# Patient Record
Sex: FEMALE | Race: WHITE | Employment: PART TIME | URBAN - METROPOLITAN AREA
[De-identification: names, ages, dates, MRNs, and addresses within clinical notes are randomized per-mention and may not be internally consistent; named-entity substitution may affect disease eponyms.]

---

## 2017-02-13 PROBLEM — N93.9 MENSTRUAL BLEEDING PROBLEM: Status: ACTIVE | Noted: 2017-02-13

## 2017-07-31 PROBLEM — N89.8 VAGINAL DISCHARGE: Status: ACTIVE | Noted: 2017-07-31

## 2017-07-31 PROBLEM — R32 INCONTINENCE IN FEMALE: Status: ACTIVE | Noted: 2017-07-31

## 2017-09-14 ENCOUNTER — APPOINTMENT (OUTPATIENT)
Dept: PHYSICAL THERAPY | Age: 32
End: 2017-09-14
Attending: OBSTETRICS & GYNECOLOGY

## 2017-09-29 ENCOUNTER — APPOINTMENT (OUTPATIENT)
Dept: PHYSICAL THERAPY | Age: 32
End: 2017-09-29
Attending: OBSTETRICS & GYNECOLOGY

## 2017-10-05 ENCOUNTER — APPOINTMENT (OUTPATIENT)
Dept: PHYSICAL THERAPY | Age: 32
End: 2017-10-05
Attending: OBSTETRICS & GYNECOLOGY

## 2017-10-12 ENCOUNTER — APPOINTMENT (OUTPATIENT)
Dept: PHYSICAL THERAPY | Age: 32
End: 2017-10-12
Attending: OBSTETRICS & GYNECOLOGY

## 2017-10-19 ENCOUNTER — APPOINTMENT (OUTPATIENT)
Dept: PHYSICAL THERAPY | Age: 32
End: 2017-10-19
Attending: OBSTETRICS & GYNECOLOGY

## 2017-10-26 ENCOUNTER — APPOINTMENT (OUTPATIENT)
Dept: PHYSICAL THERAPY | Age: 32
End: 2017-10-26
Attending: OBSTETRICS & GYNECOLOGY

## 2017-11-10 ENCOUNTER — HOSPITAL ENCOUNTER (OUTPATIENT)
Dept: PHYSICAL THERAPY | Age: 32
Discharge: HOME | End: 2017-11-10
Attending: OBSTETRICS & GYNECOLOGY

## 2017-11-10 NOTE — PROGRESS NOTES
Therapy Center at [address]  [address], [address] [facility], [facility] [address]  Phone: [phone]   Fax: [phone]    Pt cancelled today's physical therapy appointment for an initial evaluation.       Vish Pathak, MPT

## 2018-01-03 PROBLEM — R00.2 PALPITATIONS: Status: ACTIVE | Noted: 2018-01-03

## 2018-04-02 ENCOUNTER — HOSPITAL ENCOUNTER (OUTPATIENT)
Dept: PHYSICAL THERAPY | Age: 33
Discharge: HOME | End: 2018-04-02
Attending: OBSTETRICS & GYNECOLOGY
Payer: COMMERCIAL

## 2018-04-02 DIAGNOSIS — N39.3 FEMALE STRESS INCONTINENCE: ICD-10-CM

## 2018-04-02 PROCEDURE — 97161 PT EVAL LOW COMPLEX 20 MIN: CPT

## 2018-04-02 NOTE — PROGRESS NOTES
Ambulatory/Rehab Services H2 Model Falls Risk Assessment    Risk Factor Pts. ·   Confusion/Disorientation/Impulsivity  []    4 ·   Symptomatic Depression  []   2 ·   Altered Elimination  []   1 ·   Dizziness/Vertigo  []   1 ·   Gender (Male)  []   1 ·   Any administered antiepileptics (anticonvulsants):  []   2 ·   Any administered benzodiazepines:  []   1 ·   Visual Impairment (specify):  []   1 ·   Portable Oxygen Use  []   1 ·   Orthostatic ? BP  []   1 ·   History of Recent Falls (within 3 mos.)  []   5     Ability to Rise from Chair (choose one) Pts. ·   Ability to rise in a single movement  [x]   0 ·   Pushes up, successful in one attempt  []   1 ·   Multiple attempts, but successful  []   3 ·   Unable to rise without assistance  []   4   Total: (5 or greater = High Risk) 0     Falls Prevention Plan:   []                Physical Limitations to Exercise (specify):   []                Mobility Assistance Device (type):   []                Exercise/Equipment Adaptation (specify):    ©2010 [facility] of [address] Patent #[id number].  Federal Law prohibits the replication, distribution or use without written permission from [facility] [employer]

## 2018-04-02 NOTE — THERAPY EVALUATION
Steve Anders  : 1985  Primary: [facility]  Secondary:  [address] at [facility]  [address], [address], [address]  [phone]   [phone]           OUTPATIENT PHYSICAL THERAPY:Initial Assessment 2018    ICD-10: Treatment Diagnosis: Stress Incontinence (N39.3)   Precautions/Allergies:   Doxycycline and Bactrim [sulfamethoprim ds]   Fall Risk Score: 0 (? 5 = High Risk)  MD Orders: Evaluate and treat  MEDICAL/REFERRING DIAGNOSIS:  Female stress incontinence [N39.3]   DATE OF ONSET: 6 yrs  REFERRING PHYSICIAN: Zack Vásquez MD  RETURN PHYSICIAN APPOINTMENT: unknown     INITIAL ASSESSMENT:  Ms. Jenn Gustafson presents with decreased strength, endurance, and coordination of PFmm as well as mild laxity of anterior vaginal wall, both likely contributing to her mild SHANNON. She was only able to hold contraction 2 sec at a time today at a 2/5 strength. Pt will benefit from physical therapy to address stated problems. Plan of care was discussed and agreed upon with patient and HEP was initiated. Thank you for the opportunity to work with this patient. PROBLEM LIST (Impacting functional limitations):  1. Decreased strength of pelvic floor which limits bladder control INTERVENTIONS PLANNED:  1. Neuromuscular re-education  2. Biofeedback as needed  3. HEP  4. Bladder retraining  5. Bladder education  6. Manual Therapy  7. Therapeutic Activites  8. Therapeutic Exercise/Strengthening   TREATMENT PLAN:  Effective Dates: 2018 TO 2018 (90 days). Frequency/Duration: 1 time a week for 90 Days  GOALS: (Goals have been discussed and agreed upon with patient.)  Short-Term Functional Goals: Time Frame: 4 weeks  1. Patient will demonstrate independence with home exercise program.  2. Pt will demonstrate ability to perform kegel in isolation for improved ability to strengthen  Discharge Goals: Time Frame: 2018   1.  Pt will score 0 on PFIQ7 for overall functional improvement. 2. Pt will demonstrate improvement in PFmm strength and endurance to perform kegel 10/10 on biofeedback. 3. Pt will report ability to perform a full day at work without any leaking for decreased social anxiety. Rehabilitation Potential For Stated Goals: Good  Regarding Don Mcdaniel's therapy, I certify that the treatment plan above will be carried out by a therapist or under their direction. Thank you for this referral,  Dora Mclean PT     Referring Physician Signature: Sin Child MD              Date                    The information in this section was collected on 04/02/18  (except where otherwise noted). HISTORY:   History of Present Injury/Illness (Reason for Referral):  6 year history. Feels like she can't work PFmm correctly. Not needing pads yet. Can't feel leaking just notices underwear a little wet sometimes. Past Medical History/Comorbidities:   Ms. Benja Marrero  has a past medical history of Arthritis; Cervical cancer ([address] Kidney stones; Neurological disorder; Other ill-defined conditions(799[address]89); and Psychiatric disorder. She also has no past medical history of Asthma; Autoimmune disease ([address] CAD (coronary artery disease); Cancer ([address] Chronic kidney disease; COPD; DEMENTIA; Diabetes ([address] Endocrine disease; Gastrointestinal disorder; Heart failure ([address] Hypertension; Infectious disease; Liver disease; PUD (peptic ulcer disease); Seizures ([address] Sleep disorder; Stroke [facility]); or Thromboembolus ([address] Ms. Benja Marrero  has a past surgical history that includes hx gyn and hx tubal ligation. 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         Activity level:works out occasionally  Current Medications:    Current Outpatient Prescriptions:     amoxicillin (AMOXIL) 250 mg capsule, TAKE ONE CAPSULE BY MOUTH TWICE A DAY WITH FOOD, Disp: , Rfl: 0    PARoxetine CR (PAXIL-CR) 37.5 mg tablet, TAKE 1 TABLET EVERY DAY, Disp: , Rfl: 1    buPROPion SR (WELLBUTRIN SR) 150 mg SR tablet, Take 150 mg by mouth., Disp: , Rfl:    Date Last Reviewed:  04/02/18   Voiding Patterns:  Patient voids every few hours during the day and 1-2 times during the night. Patient reports that she empties bladder fully only after she stands up after urinating. Patient uses 0 pads for bladder protection; she changes pads 0 times per day. Fluid Intake:  Patient drinks 2-3 cups of water per day. She drinks primarily diet soda and coffee  She consumes 4-5 cups of caffeinated beverages per day. Patient does not limit fluid intake to control bladder. Bowel Habits:  Patient demonstrates normal bowel habits.   Incontinence History:  PROBLEM: YES/NO: COMMENTS:   Loss of urine with coughing Don't know  usually don't know when leak   Loss of urine with exercise Yes  jump trampoline   Loss of urine with strong urge No     Loss of urine with key in lock or running water No     Have difficulty stopping urine stream No     Experience pain or burning with urination No     Have blood in urine No         Number of Personal Factors/Comorbidities that affect the Plan of Care: 1-2: MODERATE COMPLEXITY   EXAMINATION:   External Observation:   · Voluntary Contraction: present  · Voluntary Relaxation: present  · Perineal Body Assessment: some prior tearing evident  · Skin Integrity: normal  · Q-tip Test: no pain  · Vaginal Vault Size: within normal limits    Lacock PERFECT:  Via: vaginal canal        Strength: P: Power, E: Endurance, R: Repetitions, QF: Quick Flicks, TrA: Transverse Abdominus, T: Timing, DB: Diaphragmatic Breathing  P  2   E 2sec   R 6   QF 7   TrA Contraction but unable to coordinate with PF   T    DB Able without PFcontraction but limited during kegel                 Palpation:     Right Left   Bulbocavernosus Ischocavernosus     Superficial Transverse Perineal     Sphincter Urethrae     Compressor Urethra      Urethra-vaginalis     Deep Transverse Perineium     Obturator Internus     Iliococcygeus     Coccygeus     Pubococcygeus     Levator Ani     Adductor tender tender   Psoas     Ant. Abdominal wall             Prolapse:  · Tissue support test with bearing down (Grade 1: not visible at introitus; Grade 2: visible at introitus; Grade 3: tissue outside introitus):  · Anterior Wall = 2   · Posterior Wall = 1   · Apical = 1       Body Structures Involved:  1. Nerves  2. Muscles  3. Ligaments Body Functions Affected:  1. Mental  2. Sensory/Pain  3. Genitourinary  4. Movement Related  5. Skin Related Activities and Participation Affected:  1. Learning and Applying Knowledge  2. General Tasks and Demands  3. Mobility  4. Self Care  5. Interpersonal Interactions and Relationships   Number of elements that affect the Plan of Care: 3: MODERATE COMPLEXITY   CLINICAL PRESENTATION:   Presentation: Stable and uncomplicated: LOW COMPLEXITY   CLINICAL DECISION MAKING:   Outcome Measure: Tool Used: Pelvic Floor Impact Questionnaire--short form 7 (PFIQ-7)   Score:  Initial:   1.59%  · Bladder or Urine: 4.76  · Bowel or Rectum: 0  · Vagina or Pelvis: 0 Most Recent: X (Date: -- )  · Bladder or Urine: X  · Bowel or Rectum: X  · Vagina or Pelvis: X   Interpretation of Score: Each of the 7 sections is scored on a scale from 0-3; 0 representing \"Not at all\", 3 representing \"Quite a bit\". The mean value is taken from all the answered items, then multiplied by 100 to obtain the scale score, ranging from 0-100. This process is repeated for each column representing bowel, bladder, and pelvic pain. ?  Self Care:     - CURRENT STATUS: CI - 1%-19% impaired, limited or restricted    - GOAL STATUS: CH - 0% impaired, limited or restricted    - D/C STATUS:  ---------------To be determined---------------     Medical Necessity: · Patient demonstrates good rehab potential due to higher previous functional level. Reason for Services/Other Comments:  · Patient continues to require skilled intervention due to ongoing goals noted above . Use of outcome tool(s) and clinical judgement create a POC that gives a: Clear prediction of patient's progress: LOW COMPLEXITY   TREATMENT:   (In addition to Assessment/Re-Assessment sessions the following treatments were rendered)  Pre-treatment Symptoms/Complaints:  See above   Pain: Initial:   Pain Intensity 1: 0  Post Session:  0     THERAPEUTIC EXERCISE: (  minutes):  Exercises per grid below to improve strength and coordination. Required minimal verbal and tactile cues to promote proper body mechanics and promote proper body breathing techniques. Progressed resistance and repetitions as indicated. Date:   Date:   Date:     Activity/Exercise Parameters Parameters Parameters                                                (Used abbreviations: MET - muscle energy technique; SCS- Strain counter strain; CTM- Connective tissue mobilizations; CR- Contract/relax; SP- Sustained pressure, TrP- Trigger point release, IASTM- Instrument assisted soft tissue mobilizations, TDN- Trigger point dry needling). HEP Exercises:  Patient instructed in pelvic floor exercises listed below:  toña 3sec hold 10-15reps TID    "[employer] Portal    The following educational topics were reviewed with patient:  Bladder health, tips to control urge, bladder diary, pelvic floor anatomy, how foods affect bladder, bladder retraining. Treatment/Session Assessment:    Response to Treatment:  Pt reported good understanding of plan of care as well as exercises. All questions were answered and pt was invited to call with any further questions or issues  · Compliance with Program/Exercises: Will assess as treatment progresses. · Recommendations/Intent for next treatment session:  \"Next visit will focus on advancements to more challenging activities\".   Total Treatment Duration:  PT Patient Time In/Time Out  Time In: 1100  Time Out: [address] [facility] Eleanor Lindo

## 2018-04-11 ENCOUNTER — HOSPITAL ENCOUNTER (OUTPATIENT)
Dept: PHYSICAL THERAPY | Age: 33
Discharge: HOME | End: 2018-04-11
Attending: OBSTETRICS & GYNECOLOGY
Payer: COMMERCIAL

## 2018-04-11 DIAGNOSIS — N39.3 FEMALE STRESS INCONTINENCE: ICD-10-CM

## 2018-04-11 PROCEDURE — 97110 THERAPEUTIC EXERCISES: CPT

## 2018-04-11 NOTE — PROGRESS NOTES
Jayme Walsh  : 1985  Primary: [facility]  Secondary:  [address]  at [facility]  [address], [address] [address], [address] [facility]  [phone]   [phone]           OUTPATIENT PHYSICAL THERAPY:Daily Note 2018    ICD-10: Treatment Diagnosis: Stress Incontinence (N39.3)   Precautions/Allergies:   Doxycycline and Bactrim [sulfamethoprim ds]   Fall Risk Score: 0 (? 5 = High Risk)  MD Orders: Evaluate and treat  MEDICAL/REFERRING DIAGNOSIS:  Female stress incontinence [N39.3]   DATE OF ONSET: 6 yrs  REFERRING PHYSICIAN: Petra Langford MD  RETURN PHYSICIAN APPOINTMENT: unknown     INITIAL ASSESSMENT:  Ms. Arlene Lozano presents with decreased strength, endurance, and coordination of PFmm as well as mild laxity of anterior vaginal wall, both likely contributing to her mild SHANNON. She was only able to hold contraction 2 sec at a time today at a 2/5 strength. Pt will benefit from physical therapy to address stated problems. Plan of care was discussed and agreed upon with patient and HEP was initiated. Thank you for the opportunity to work with this patient. PROBLEM LIST (Impacting functional limitations):  1. Decreased strength of pelvic floor which limits bladder control INTERVENTIONS PLANNED:  1. Neuromuscular re-education  2. Biofeedback as needed  3. HEP  4. Bladder retraining  5. Bladder education  6. Manual Therapy  7. Therapeutic Activites  8. Therapeutic Exercise/Strengthening   TREATMENT PLAN:  Effective Dates: 2018 TO 2018 (90 days). Frequency/Duration: 1 time a week for 90 Days  GOALS: (Goals have been discussed and agreed upon with patient.)  Short-Term Functional Goals: Time Frame: 4 weeks  1. Patient will demonstrate independence with home exercise program.  2. Pt will demonstrate ability to perform kegel in isolation for improved ability to strengthen  Discharge Goals: Time Frame: 2018   1.  Pt will score 0 on PFIQ7 for overall functional improvement. 2. Pt will demonstrate improvement in PFmm strength and endurance to perform kegel 10/10 on biofeedback. 3. Pt will report ability to perform a full day at work without any leaking for decreased social anxiety. Rehabilitation Potential For Stated Goals: Good  Regarding [facility]'s therapy, I certify that the treatment plan above will be carried out by a therapist or under their direction. Thank you for this referral,  Kev Lopes, PT     Referring Physician Signature: Zack Vásquez MD              Date                    The information in this section was collected on 04/11/18  (except where otherwise noted). HISTORY:   History of Present Injury/Illness (Reason for Referral):  6 year history. Feels like she can't work PFmm correctly. Not needing pads yet. Can't feel leaking just notices underwear a little wet sometimes. Past Medical History/Comorbidities:   Ms. Jenn Gustafson  has a past medical history of Arthritis; Cervical cancer ([address] Kidney stones; Neurological disorder; Other ill-defined conditions(7[address]); and Psychiatric disorder. She also has no past medical history of Asthma; Autoimmune disease ([address] CAD (coronary artery disease); Cancer ([address] Chronic kidney disease; COPD; DEMENTIA; Diabetes ([address] Endocrine disease; Gastrointestinal disorder; Heart failure ([address] Hypertension; Infectious disease; Liver disease; PUD (peptic ulcer disease); Seizures ([address] Sleep disorder; Stroke [facility]); or Thromboembolus ([address] Ms. Jenn Gustafson  has a past surgical history that includes hx gyn and hx tubal ligation. 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         Activity level:works out occasionally  Current Medications:    Current Outpatient Prescriptions:     amoxicillin (AMOXIL) 250 mg capsule, TAKE ONE CAPSULE BY MOUTH TWICE A DAY WITH FOOD, Disp: , Rfl: 0    PARoxetine CR (PAXIL-CR) 37.5 mg tablet, TAKE 1 TABLET EVERY DAY, Disp: , Rfl: 1    buPROPion SR (WELLBUTRIN SR) 150 mg SR tablet, Take 150 mg by mouth., Disp: , Rfl:    Date Last Reviewed:  04/11/18   Voiding Patterns:  Patient voids every few hours during the day and 1-2 times during the night. Patient reports that she empties bladder fully only after she stands up after urinating. Patient uses 0 pads for bladder protection; she changes pads 0 times per day. Fluid Intake:  Patient drinks 2-3 cups of water per day. She drinks primarily diet soda and coffee  She consumes 4-5 cups of caffeinated beverages per day. Patient does not limit fluid intake to control bladder. Bowel Habits:  Patient demonstrates normal bowel habits.   Incontinence History:  PROBLEM: YES/NO: COMMENTS:   Loss of urine with coughing Don't know  usually don't know when leak   Loss of urine with exercise Yes  jump trampoline   Loss of urine with strong urge No     Loss of urine with key in lock or running water No     Have difficulty stopping urine stream No     Experience pain or burning with urination No     Have blood in urine No         Number of Personal Factors/Comorbidities that affect the Plan of Care: 1-2: MODERATE COMPLEXITY   EXAMINATION:   External Observation:   · Voluntary Contraction: present  · Voluntary Relaxation: present  · Perineal Body Assessment: some prior tearing evident  · Skin Integrity: normal  · Q-tip Test: no pain  · Vaginal Vault Size: within normal limits    [place]ock PERFECT:  Via: vaginal canal        Strength: P: Power, E: Endurance, R: Repetitions, QF: Quick Flicks, TrA: Transverse Abdominus, T: Timing, DB: Diaphragmatic Breathing  P  2   E 2sec   R 6   QF 7   TrA Contraction but unable to coordinate with PF   T    DB Able without PFcontraction but limited during kegel                 Palpation:     Right Left   Bulbocavernosus     Ischocavernosus Superficial Transverse Perineal     Sphincter Urethrae     Compressor Urethra      Urethra-vaginalis     Deep Transverse Perineium     Obturator Internus     Iliococcygeus     Coccygeus     Pubococcygeus     Levator Ani     Adductor tender tender   Psoas     Ant. Abdominal wall             Prolapse:  · Tissue support test with bearing down (Grade 1: not visible at introitus; Grade 2: visible at introitus; Grade 3: tissue outside introitus):  · Anterior Wall = 2   · Posterior Wall = 1   · Apical = 1       Body Structures Involved:  1. Nerves  2. Muscles  3. Ligaments Body Functions Affected:  1. Mental  2. Sensory/Pain  3. Genitourinary  4. Movement Related  5. Skin Related Activities and Participation Affected:  1. Learning and Applying Knowledge  2. General Tasks and Demands  3. Mobility  4. Self Care  5. Interpersonal Interactions and Relationships   Number of elements that affect the Plan of Care: 3: MODERATE COMPLEXITY   CLINICAL PRESENTATION:   Presentation: Stable and uncomplicated: LOW COMPLEXITY   CLINICAL DECISION MAKING:   Outcome Measure: Tool Used: Pelvic Floor Impact Questionnaire--short form 7 (PFIQ-7)   Score:  Initial:   1.59%  · Bladder or Urine: 4.76  · Bowel or Rectum: 0  · Vagina or Pelvis: 0 Most Recent: X (Date: -- )  · Bladder or Urine: X  · Bowel or Rectum: X  · Vagina or Pelvis: X   Interpretation of Score: Each of the 7 sections is scored on a scale from 0-3; 0 representing \"Not at all\", 3 representing \"Quite a bit\". The mean value is taken from all the answered items, then multiplied by 100 to obtain the scale score, ranging from 0-100. This process is repeated for each column representing bowel, bladder, and pelvic pain. ?  Self Care:     - CURRENT STATUS: CI - 1%-19% impaired, limited or restricted    - GOAL STATUS: CH - 0% impaired, limited or restricted    - D/C STATUS:  ---------------To be determined---------------     Medical Necessity:   · Patient demonstrates good rehab potential due to higher previous functional level. Reason for Services/Other Comments:  · Patient continues to require skilled intervention due to ongoing goals noted above . Use of outcome tool(s) and clinical judgement create a POC that gives a: Clear prediction of patient's progress: LOW COMPLEXITY   TREATMENT:   (In addition to Assessment/Re-Assessment sessions the following treatments were rendered)  Pre-treatment Symptoms/Complaints:  States she has been doing ex's once a day. No new c/o  Pain: Initial:   Pain Intensity 1: 0  Post Session:  0     THERAPEUTIC EXERCISE: (55  minutes):  Exercises per grid below to improve strength and coordination. Required minimal verbal and tactile cues to promote proper body mechanics and promote proper body breathing techniques. Progressed resistance and repetitions as indicated. Date:  4/11/18 Date:   Date:     Activity/Exercise Parameters Parameters Parameters   Isometric PFmm biofeedback     NMES 10'       TA with kegel                               biofeedback utilized to assess resting tone, 5/10,     (Used abbreviations: MET - muscle energy technique; SCS- Strain counter strain; CTM- Connective tissue mobilizations; CR- Contract/relax; SP- Sustained pressure, TrP- Trigger point release, IASTM- Instrument assisted soft tissue mobilizations, TDN- Trigger point dry needling). HEP Exercises:  Patient instructed in pelvic floor exercises listed below:  kegel 3sec hold 10-15reps TID    [employer] Portal    The following educational topics were reviewed with patient:  Bladder health, tips to control urge, bladder diary, pelvic floor anatomy, how foods affect bladder, bladder retraining. Treatment/Session Assessment:    Response to Treatment:  High resting tone noted with internal electrode but normal reading with external electrodes - likely due to anterior wall laxity.    Continue with difficulty isolating PFmm with tendency to use abdominals and breath hold.   · Compliance with Program/Exercises: Will assess as treatment progresses. · Recommendations/Intent for next treatment session: \"Next visit will focus on advancements to more challenging activities\".   Total Treatment Duration:  PT Patient Time In/Time Out  Time In: 1330  Time Out: [address] [facility] [place]

## 2018-04-19 ENCOUNTER — HOSPITAL ENCOUNTER (OUTPATIENT)
Dept: PHYSICAL THERAPY | Age: 33
Discharge: HOME | End: 2018-04-19
Attending: OBSTETRICS & GYNECOLOGY
Payer: COMMERCIAL

## 2018-04-19 DIAGNOSIS — N39.3 FEMALE STRESS INCONTINENCE: ICD-10-CM

## 2018-04-19 PROCEDURE — 97110 THERAPEUTIC EXERCISES: CPT

## 2018-04-19 NOTE — PROGRESS NOTES
Jeison Anthony  : 1985  Primary: [facility]  Secondary:  [address]  at [place] [id number] [facility], [address] [place] [address],[address], [facility] [address]  [phone]   [phone]           OUTPATIENT PHYSICAL THERAPY:Daily Note 2018    ICD-10: Treatment Diagnosis: Stress Incontinence (N39.3)   Precautions/Allergies:   Doxycycline and Bactrim [sulfamethoprim ds]   Fall Risk Score: 0 (? 5 = High Risk)  MD Orders: Evaluate and treat  MEDICAL/REFERRING DIAGNOSIS:  Female stress incontinence [N39.3]   DATE OF ONSET: 6 yrs  REFERRING PHYSICIAN: Connie Pineda MD  RETURN PHYSICIAN APPOINTMENT: unknown     INITIAL ASSESSMENT:  Ms. Corrinne Alto presents with decreased strength, endurance, and coordination of PFmm as well as mild laxity of anterior vaginal wall, both likely contributing to her mild SHANNON. She was only able to hold contraction 2 sec at a time today at a 2/5 strength. Pt will benefit from physical therapy to address stated problems. Plan of care was discussed and agreed upon with patient and HEP was initiated. Thank you for the opportunity to work with this patient. PROBLEM LIST (Impacting functional limitations):  1. Decreased strength of pelvic floor which limits bladder control INTERVENTIONS PLANNED:  1. Neuromuscular re-education  2. Biofeedback as needed  3. HEP  4. Bladder retraining  5. Bladder education  6. Manual Therapy  7. Therapeutic Activites  8. Therapeutic Exercise/Strengthening   TREATMENT PLAN:  Effective Dates: 2018 TO 2018 (90 days). Frequency/Duration: 1 time a week for 90 Days  GOALS: (Goals have been discussed and agreed upon with patient.)  Short-Term Functional Goals: Time Frame: 4 weeks  1. Patient will demonstrate independence with home exercise program.  2. Pt will demonstrate ability to perform kegel in isolation for improved ability to strengthen  Discharge Goals: Time Frame: 2018   1.  Pt will score 0 on PFIQ7 for overall functional improvement. 2. Pt will demonstrate improvement in PFmm strength and endurance to perform kegel 10/10 on biofeedback. 3. Pt will report ability to perform a full day at work without any leaking for decreased social anxiety. Rehabilitation Potential For Stated Goals: Good  Regarding Sandor Mcdaniel's therapy, I certify that the treatment plan above will be carried out by a therapist or under their direction. Thank you for this referral,  Karen Armenta, PT     Referring Physician Signature: Munir Nielson MD              Date                    The information in this section was collected on 04/19/18  (except where otherwise noted). HISTORY:   History of Present Injury/Illness (Reason for Referral):  6 year history. Feels like she can't work PFmm correctly. Not needing pads yet. Can't feel leaking just notices underwear a little wet sometimes. Past Medical History/Comorbidities:   Ms. Funmilayo Baldwin  has a past medical history of Arthritis; Cervical cancer ([address] Kidney stones; Neurological disorder; Other ill-defined conditions(799.89); and Psychiatric disorder. She also has no past medical history of Asthma; Autoimmune disease ([address] CAD (coronary artery disease); Cancer ([address] Chronic kidney disease; COPD; DEMENTIA; Diabetes ([address] Endocrine disease; Gastrointestinal disorder; Heart failure ([address] Hypertension; Infectious disease; Liver disease; PUD (peptic ulcer disease); Seizures ([address] Sleep disorder; Stroke [facility]); or Thromboembolus ([address] Ms. Funmilayo Baldwin  has a past surgical history that includes hx gyn and hx tubal ligation. 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         Activity level:works out occasionally  Current Medications:    Current Outpatient Prescriptions:     amoxicillin (AMOXIL) 250 mg capsule, TAKE ONE CAPSULE BY MOUTH TWICE A DAY WITH FOOD, Disp: , Rfl: 0    PARoxetine CR (PAXIL-CR) 37.5 mg tablet, TAKE 1 TABLET EVERY DAY, Disp: , Rfl: 1    buPROPion SR (WELLBUTRIN SR) 150 mg SR tablet, Take 150 mg by mouth., Disp: , Rfl:    Date Last Reviewed:  04/19/18   Voiding Patterns:  Patient voids every few hours during the day and 1-2 times during the night. Patient reports that she empties bladder fully only after she stands up after urinating. Patient uses 0 pads for bladder protection; she changes pads 0 times per day. Fluid Intake:  Patient drinks 2-3 cups of water per day. She drinks primarily diet soda and coffee  She consumes 4-5 cups of caffeinated beverages per day. Patient does not limit fluid intake to control bladder. Bowel Habits:  Patient demonstrates normal bowel habits.   Incontinence History:  PROBLEM: YES/NO: COMMENTS:   Loss of urine with coughing Don't know  usually don't know when leak   Loss of urine with exercise Yes  jump trampoline   Loss of urine with strong urge No     Loss of urine with key in lock or running water No     Have difficulty stopping urine stream No     Experience pain or burning with urination No     Have blood in urine No         Number of Personal Factors/Comorbidities that affect the Plan of Care: 1-2: MODERATE COMPLEXITY   EXAMINATION:   External Observation:   · Voluntary Contraction: present  · Voluntary Relaxation: present  · Perineal Body Assessment: some prior tearing evident  · Skin Integrity: normal  · Q-tip Test: no pain  · Vaginal Vault Size: within normal limits    [place]ock PERFECT:  Via: vaginal canal        Strength: P: Power, E: Endurance, R: Repetitions, QF: Quick Flicks, TrA: Transverse Abdominus, T: Timing, DB: Diaphragmatic Breathing  P  2   E 2sec   R 6   QF 7   TrA Contraction but unable to coordinate with PF   T    DB Able without PFcontraction but limited during kegel                 Palpation:     Right Left   Bulbocavernosus     Ischocavernosus Superficial Transverse Perineal     Sphincter Urethrae     Compressor Urethra      Urethra-vaginalis     Deep Transverse Perineium     Obturator Internus     Iliococcygeus     Coccygeus     Pubococcygeus     Levator Ani     Adductor tender tender   Psoas     Ant. Abdominal wall             Prolapse:  · Tissue support test with bearing down (Grade 1: not visible at introitus; Grade 2: visible at introitus; Grade 3: tissue outside introitus):  · Anterior Wall = 2   · Posterior Wall = 1   · Apical = 1       Body Structures Involved:  1. Nerves  2. Muscles  3. Ligaments Body Functions Affected:  1. Mental  2. Sensory/Pain  3. Genitourinary  4. Movement Related  5. Skin Related Activities and Participation Affected:  1. Learning and Applying Knowledge  2. General Tasks and Demands  3. Mobility  4. Self Care  5. Interpersonal Interactions and Relationships   Number of elements that affect the Plan of Care: 3: MODERATE COMPLEXITY   CLINICAL PRESENTATION:   Presentation: Stable and uncomplicated: LOW COMPLEXITY   CLINICAL DECISION MAKING:   Outcome Measure: Tool Used: Pelvic Floor Impact Questionnaire--short form 7 (PFIQ-7)   Score:  Initial:   1.59%  · Bladder or Urine: 4.76  · Bowel or Rectum: 0  · Vagina or Pelvis: 0 Most Recent: X (Date: -- )  · Bladder or Urine: X  · Bowel or Rectum: X  · Vagina or Pelvis: X   Interpretation of Score: Each of the 7 sections is scored on a scale from 0-3; 0 representing \"Not at all\", 3 representing \"Quite a bit\". The mean value is taken from all the answered items, then multiplied by 100 to obtain the scale score, ranging from 0-100. This process is repeated for each column representing bowel, bladder, and pelvic pain. ?  Self Care:     - CURRENT STATUS: CI - 1%-19% impaired, limited or restricted    - GOAL STATUS: CH - 0% impaired, limited or restricted    - D/C STATUS:  ---------------To be determined---------------     Medical Necessity:   · Patient demonstrates good rehab potential due to higher previous functional level. Reason for Services/Other Comments:  · Patient continues to require skilled intervention due to ongoing goals noted above . Use of outcome tool(s) and clinical judgement create a POC that gives a: Clear prediction of patient's progress: LOW COMPLEXITY   TREATMENT:   (In addition to Assessment/Re-Assessment sessions the following treatments were rendered)  Pre-treatment Symptoms/Complaints:  No new c/o. Bought k-fit and brought it in today. States she jumped on the trampoline yesterday and feels she didn't have ki  Pain: Initial:   Pain Intensity 1: 0  Post Session:  0     THERAPEUTIC EXERCISE: (60  minutes):  Exercises per grid below to improve strength and coordination. Required minimal verbal and tactile cues to promote proper body mechanics and promote proper body breathing techniques. Progressed resistance and repetitions as indicated. Date:  4/11/18 Date:  4/19/19 Date:     Activity/Exercise Parameters Parameters Parameters   Isometric PFmm biofeedback biofeedback    NMES 10'       TA with kegel  5\" 10x    Bridge with ball squeeze  10x    Bridge with isometric ER  BTB 10x    Sidelying clams with TB      squats  10x     biofeedback utilized to assess resting tone, 5/10,     (Used abbreviations: MET - muscle energy technique; SCS- Strain counter strain; CTM- Connective tissue mobilizations; CR- Contract/relax; SP- Sustained pressure, TrP- Trigger point release, IASTM- Instrument assisted soft tissue mobilizations, TDN- Trigger point dry needling). HEP Exercises:  Patient instructed in pelvic floor exercises listed below:  kegel 3sec hold 10-15reps TID    [employer] Portal    The following educational topics were reviewed with patient:  Bladder health, tips to control urge, bladder diary, pelvic floor anatomy, how foods affect bladder, bladder retraining.   Treatment/Session Assessment:    Response to Treatment:  Much improved isolation during biofeedback. Good initiation of functional ex's. Set up NMES for use at home. Progress as able  · Compliance with Program/Exercises: Will assess as treatment progresses. · Recommendations/Intent for next treatment session: \"Next visit will focus on advancements to more challenging activities\".   Total Treatment Duration:  PT Patient Time In/Time Out  Time In: 0930  Time Out: 1030    Raghu Salinas [place]

## 2018-04-27 ENCOUNTER — APPOINTMENT (OUTPATIENT)
Dept: PHYSICAL THERAPY | Age: 33
End: 2018-04-27
Attending: OBSTETRICS & GYNECOLOGY
Payer: COMMERCIAL

## 2018-05-04 ENCOUNTER — HOSPITAL ENCOUNTER (OUTPATIENT)
Dept: PHYSICAL THERAPY | Age: 33
Discharge: HOME | End: 2018-05-04
Attending: OBSTETRICS & GYNECOLOGY
Payer: COMMERCIAL

## 2018-05-04 DIAGNOSIS — N39.3 FEMALE STRESS INCONTINENCE: ICD-10-CM

## 2018-05-04 PROCEDURE — 97110 THERAPEUTIC EXERCISES: CPT

## 2018-05-04 NOTE — PROGRESS NOTES
Jeison Anthony  : 1985  Primary: [facility]  Secondary:  [facility] at [place] [address],[address], [facility] [address]  [phone]   [phone]           OUTPATIENT PHYSICAL THERAPY:Daily Note 2018    ICD-10: Treatment Diagnosis: Stress Incontinence (N39.3)   Precautions/Allergies:   Doxycycline and Bactrim [sulfamethoprim ds]   Fall Risk Score: 0 (? 5 = High Risk)  MD Orders: Evaluate and treat  MEDICAL/REFERRING DIAGNOSIS:  Female stress incontinence [N39.3]   DATE OF ONSET: 6 yrs  REFERRING PHYSICIAN: Connie Pineda MD  RETURN PHYSICIAN APPOINTMENT: unknown     INITIAL ASSESSMENT:  Ms. Corrinne Alto presents with decreased strength, endurance, and coordination of PFmm as well as mild laxity of anterior vaginal wall, both likely contributing to her mild SHANNON. She was only able to hold contraction 2 sec at a time today at a 2/5 strength. Pt will benefit from physical therapy to address stated problems. Plan of care was discussed and agreed upon with patient and HEP was initiated. Thank you for the opportunity to work with this patient. PROBLEM LIST (Impacting functional limitations):  1. Decreased strength of pelvic floor which limits bladder control INTERVENTIONS PLANNED:  1. Neuromuscular re-education  2. Biofeedback as needed  3. HEP  4. Bladder retraining  5. Bladder education  6. Manual Therapy  7. Therapeutic Activites  8. Therapeutic Exercise/Strengthening   TREATMENT PLAN:  Effective Dates: 2018 TO 2018 (90 days). Frequency/Duration: 1 time a week for 90 Days  GOALS: (Goals have been discussed and agreed upon with patient.)  Short-Term Functional Goals: Time Frame: 4 weeks  1. Patient will demonstrate independence with home exercise program.  2. Pt will demonstrate ability to perform kegel in isolation for improved ability to strengthen  Discharge Goals: Time Frame: 2018   1.  Pt will score 0 on PFIQ7 for overall functional improvement. 2. Pt will demonstrate improvement in PFmm strength and endurance to perform kegel 10/10 on biofeedback. 3. Pt will report ability to perform a full day at work without any leaking for decreased social anxiety. Rehabilitation Potential For Stated Goals: Good  Regarding Caterina Mcdaniel's therapy, I certify that the treatment plan above will be carried out by a therapist or under their direction. Thank you for this referral,  Dionte Sanchez, PT     Referring Physician Signature: Drew Hobbs MD              Date                    The information in this section was collected on 05/04/18  (except where otherwise noted). HISTORY:   History of Present Injury/Illness (Reason for Referral):  6 year history. Feels like she can't work PFmm correctly. Not needing pads yet. Can't feel leaking just notices underwear a little wet sometimes. Past Medical History/Comorbidities:   Ms. Kat Gonzales  has a past medical history of Arthritis; Cervical cancer ([address] Kidney stones; Neurological disorder; Other ill-defined conditions([address]); and Psychiatric disorder. She also has no past medical history of Asthma; Autoimmune disease ([address] CAD (coronary artery disease); Cancer ([address] Chronic kidney disease; COPD; DEMENTIA; Diabetes ([address] Endocrine disease; Gastrointestinal disorder; Heart failure ([address] Hypertension; Infectious disease; Liver disease; PUD (peptic ulcer disease); Seizures ([address] Sleep disorder; Stroke [facility]); or Thromboembolus ([address] Ms. Kat Gonzales  has a past surgical history that includes hx gyn and hx tubal ligation. 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         Activity level:works out occasionally  Current Medications:    Current Outpatient Prescriptions:     amoxicillin (AMOXIL) 250 mg capsule, TAKE ONE CAPSULE BY MOUTH TWICE A DAY WITH FOOD, Disp: , Rfl: 0    PARoxetine CR (PAXIL-CR) 37.5 mg tablet, TAKE 1 TABLET EVERY DAY, Disp: , Rfl: 1    buPROPion SR (WELLBUTRIN SR) 150 mg SR tablet, Take 150 mg by mouth., Disp: , Rfl:    Date Last Reviewed:  05/04/18   Voiding Patterns:  Patient voids every few hours during the day and 1-2 times during the night. Patient reports that she empties bladder fully only after she stands up after urinating. Patient uses 0 pads for bladder protection; she changes pads 0 times per day. Fluid Intake:  Patient drinks 2-3 cups of water per day. She drinks primarily diet soda and coffee  She consumes 4-5 cups of caffeinated beverages per day. Patient does not limit fluid intake to control bladder. Bowel Habits:  Patient demonstrates normal bowel habits.   Incontinence History:  PROBLEM: YES/NO: COMMENTS:   Loss of urine with coughing Don't know  usually don't know when leak   Loss of urine with exercise Yes  jump trampoline   Loss of urine with strong urge No     Loss of urine with key in lock or running water No     Have difficulty stopping urine stream No     Experience pain or burning with urination No     Have blood in urine No         Number of Personal Factors/Comorbidities that affect the Plan of Care: 1-2: MODERATE COMPLEXITY   EXAMINATION:   External Observation:   · Voluntary Contraction: present  · Voluntary Relaxation: present  · Perineal Body Assessment: some prior tearing evident  · Skin Integrity: normal  · Q-tip Test: no pain  · Vaginal Vault Size: within normal limits    [place]ock PERFECT:  Via: vaginal canal        Strength: P: Power, E: Endurance, R: Repetitions, QF: Quick Flicks, TrA: Transverse Abdominus, T: Timing, DB: Diaphragmatic Breathing  P  2   E 2sec   R 6   QF 7   TrA Contraction but unable to coordinate with PF   T    DB Able without PFcontraction but limited during kegel                 Palpation:     Right Left   Bulbocavernosus     Ischocavernosus Superficial Transverse Perineal     Sphincter Urethrae     Compressor Urethra      Urethra-vaginalis     Deep Transverse Perineium     Obturator Internus     Iliococcygeus     Coccygeus     Pubococcygeus     Levator Ani     Adductor tender tender   Psoas     Ant. Abdominal wall             Prolapse:  · Tissue support test with bearing down (Grade 1: not visible at introitus; Grade 2: visible at introitus; Grade 3: tissue outside introitus):  · Anterior Wall = 2   · Posterior Wall = 1   · Apical = 1       Body Structures Involved:  1. Nerves  2. Muscles  3. Ligaments Body Functions Affected:  1. Mental  2. Sensory/Pain  3. Genitourinary  4. Movement Related  5. Skin Related Activities and Participation Affected:  1. Learning and Applying Knowledge  2. General Tasks and Demands  3. Mobility  4. Self Care  5. Interpersonal Interactions and Relationships   Number of elements that affect the Plan of Care: 3: MODERATE COMPLEXITY   CLINICAL PRESENTATION:   Presentation: Stable and uncomplicated: LOW COMPLEXITY   CLINICAL DECISION MAKING:   Outcome Measure: Tool Used: Pelvic Floor Impact Questionnaire--short form 7 (PFIQ-7)   Score:  Initial:   1.59%  · Bladder or Urine: 4.76  · Bowel or Rectum: 0  · Vagina or Pelvis: 0 Most Recent: X (Date: -- )  · Bladder or Urine: X  · Bowel or Rectum: X  · Vagina or Pelvis: X   Interpretation of Score: Each of the 7 sections is scored on a scale from 0-3; 0 representing \"Not at all\", 3 representing \"Quite a bit\". The mean value is taken from all the answered items, then multiplied by 100 to obtain the scale score, ranging from 0-100. This process is repeated for each column representing bowel, bladder, and pelvic pain. ?  Self Care:     - CURRENT STATUS: CI - 1%-19% impaired, limited or restricted    - GOAL STATUS: CH - 0% impaired, limited or restricted    - D/C STATUS:  ---------------To be determined---------------     Medical Necessity:   · Patient demonstrates good rehab potential due to higher previous functional level. Reason for Services/Other Comments:  · Patient continues to require skilled intervention due to ongoing goals noted above . Use of outcome tool(s) and clinical judgement create a POC that gives a: Clear prediction of patient's progress: LOW COMPLEXITY   TREATMENT:   (In addition to Assessment/Re-Assessment sessions the following treatments were rendered)  Pre-treatment Symptoms/Complaints:  Pt has been doing NMES every day. Long sessions and had increase in leaking with hard workout lately. States she is unable to feel exactly when she is leaking just notices that underwear is wet. Pain: Initial:   Pain Intensity 1: 0  Post Session:  0     THERAPEUTIC EXERCISE: (60  minutes):  Exercises per grid below to improve strength and coordination. Required minimal verbal and tactile cues to promote proper body mechanics and promote proper body breathing techniques. Progressed resistance and repetitions as indicated. Date:  4/11/18 Date:  4/19/19 Date:  5/4/18   Activity/Exercise Parameters Parameters Parameters   Isometric PFmm biofeedback biofeedback    NMES 10'    home   TA with kegel  5\" 10x reviewed   Bridge with ball squeeze  10x 2 min   Bridge with isometric ER  BTB 10x BTB  30\" large range; 30\" small   Sidelying clams with TB      squats  10x 10x   Plank front   Hands 15\" x3   Plank side   15\" x3   Squat w one foot on box   10x bilat   Tramp jumps   10\" x3, 30\" x3          biofeedback utilized to assess resting tone, 5/10,     (Used abbreviations: MET - muscle energy technique; SCS- Strain counter strain; CTM- Connective tissue mobilizations; CR- Contract/relax; SP- Sustained pressure, TrP- Trigger point release, IASTM- Instrument assisted soft tissue mobilizations, TDN- Trigger point dry needling).      HEP Exercises:  Patient instructed in pelvic floor exercises listed below:  kegel 3sec hold 10-15reps TID    [employer] Portal    The following educational topics were reviewed with patient:  Bladder health, tips to control urge, bladder diary, pelvic floor anatomy, how foods affect bladder, bladder retraining. Treatment/Session Assessment:    Response to Treatment:  Discussed fatigue of muscles with extra long workouts every day when not used to it. Pt ed fluctuating workouts with NMES at home and not doing super long workouts every day. Pt noticed mild leaking after treatment and was unable to determine when she started leaking. · Compliance with Program/Exercises: Will assess as treatment progresses. · Recommendations/Intent for next treatment session: \"Next visit will focus on advancements to more challenging activities\".   Total Treatment Duration:  PT Patient Time In/Time Out  Time In: 0930  Time Out: 1030    Gary Villaseñor [place]

## 2018-05-23 ENCOUNTER — APPOINTMENT (OUTPATIENT)
Dept: PHYSICAL THERAPY | Age: 33
End: 2018-05-23
Attending: OBSTETRICS & GYNECOLOGY
Payer: COMMERCIAL

## 2018-06-06 ENCOUNTER — HOSPITAL ENCOUNTER (OUTPATIENT)
Dept: PHYSICAL THERAPY | Age: 33
Discharge: HOME | End: 2018-06-06
Attending: OBSTETRICS & GYNECOLOGY
Payer: COMMERCIAL

## 2018-06-06 PROCEDURE — 97110 THERAPEUTIC EXERCISES: CPT

## 2018-06-06 NOTE — PROGRESS NOTES
Diana Jimenez  : 1985  Primary: [facility]  Secondary:  [address]  at [address], [address] [place] [address],[address], [facility] [address]  [phone]   [phone]           OUTPATIENT PHYSICAL THERAPY:Daily Note and Progress Report 2018    ICD-10: Treatment Diagnosis: Stress Incontinence (N39.3)   Precautions/Allergies:   Doxycycline and Bactrim [sulfamethoprim ds]   Fall Risk Score: 0 (? 5 = High Risk)  MD Orders: Evaluate and treat  MEDICAL/REFERRING DIAGNOSIS:  Disorder of urinary system, unspecified [N39.9]   DATE OF ONSET: 6 yrs  REFERRING PHYSICIAN: Sin Child MD  RETURN PHYSICIAN APPOINTMENT: unknown     INITIAL ASSESSMENT:  Ms. Benja Marrero presents with decreased strength, endurance, and coordination of PFmm as well as mild laxity of anterior vaginal wall, both likely contributing to her mild SHANNON. She was only able to hold contraction 2 sec at a time today at a 2/5 strength. Pt will benefit from physical therapy to address stated problems. Plan of care was discussed and agreed upon with patient and HEP was initiated. Thank you for the opportunity to work with this patient. PROBLEM LIST (Impacting functional limitations):  1. Decreased strength of pelvic floor which limits bladder control INTERVENTIONS PLANNED:  1. Neuromuscular re-education  2. Biofeedback as needed  3. HEP  4. Bladder retraining  5. Bladder education  6. Manual Therapy  7. Therapeutic Activites  8. Therapeutic Exercise/Strengthening   TREATMENT PLAN:  Effective Dates: 2018 TO 2018 (90 days). Frequency/Duration: 1 time a week for 90 Days  GOALS: (Goals have been discussed and agreed upon with patient.)  Short-Term Functional Goals: Time Frame: 4 weeks  1. Patient will demonstrate independence with home exercise program.  Goal met 18  2. Pt will demonstrate ability to perform kegel in isolation for improved ability to strengthen.   Goal met 18  Discharge Goals: Time Frame: 7/1/2018   1. Pt will score 0 on PFIQ7 for overall functional improvement. 2. Pt will demonstrate improvement in PFmm strength and endurance to perform kegel 10/10 on biofeedback. Goal improved but ongoing 6/6/18  3. Pt will report ability to perform a full day at work without any leaking for decreased social anxiety. Goal ongoing 6/6/18  Rehabilitation Potential For Stated Goals: Good  Regarding Dayton Mcdaniel's therapy, I certify that the treatment plan above will be carried out by a therapist or under their direction. Thank you for this referral,  Sadie Andrea PT     Referring Physician Signature: Marli Johnston MD              Date                    The information in this section was collected on 06/06/18  (except where otherwise noted). HISTORY:   History of Present Injury/Illness (Reason for Referral):  6 year history. Feels like she can't work PFmm correctly. Not needing pads yet. Can't feel leaking just notices underwear a little wet sometimes. Past Medical History/Comorbidities:   Ms. Elvira Valladares  has a past medical history of Arthritis; Cervical cancer ([address] Kidney stones; Neurological disorder; Other ill-defined conditions(799.89); and Psychiatric disorder. She also has no past medical history of Asthma; Autoimmune disease ([address] CAD (coronary artery disease); Cancer ([address] Chronic kidney disease; COPD; DEMENTIA; Diabetes ([address] Endocrine disease; Gastrointestinal disorder; Heart failure ([address] Hypertension; Infectious disease; Liver disease; PUD (peptic ulcer disease); Seizures ([address] Sleep disorder; Stroke [facility]); or Thromboembolus ([address] Ms. Elvira Valladares  has a past surgical history that includes hx gyn and hx tubal ligation. 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         Activity level:works out occasionally  Current Medications:    Current Outpatient Prescriptions:     amoxicillin (AMOXIL) 250 mg capsule, TAKE ONE CAPSULE BY MOUTH TWICE A DAY WITH FOOD, Disp: , Rfl: 0    PARoxetine CR (PAXIL-CR) 37.5 mg tablet, TAKE 1 TABLET EVERY DAY, Disp: , Rfl: 1    buPROPion SR (WELLBUTRIN SR) 150 mg SR tablet, Take 150 mg by mouth., Disp: , Rfl:    Date Last Reviewed:  06/06/18   Voiding Patterns:  Patient voids every few hours during the day and 1-2 times during the night. Patient reports that she empties bladder fully only after she stands up after urinating. Patient uses 0 pads for bladder protection; she changes pads 0 times per day. Fluid Intake:  Patient drinks 2-3 cups of water per day. She drinks primarily diet soda and coffee  She consumes 4-5 cups of caffeinated beverages per day. Patient does not limit fluid intake to control bladder. Bowel Habits:  Patient demonstrates normal bowel habits.   Incontinence History:  PROBLEM: YES/NO: COMMENTS:   Loss of urine with coughing Don't know  usually don't know when leak   Loss of urine with exercise Yes  jump trampoline   Loss of urine with strong urge No     Loss of urine with key in lock or running water No     Have difficulty stopping urine stream No     Experience pain or burning with urination No     Have blood in urine No         Number of Personal Factors/Comorbidities that affect the Plan of Care: 1-2: MODERATE COMPLEXITY   EXAMINATION:   External Observation:   · Voluntary Contraction: present  · Voluntary Relaxation: present  · Perineal Body Assessment: some prior tearing evident  · Skin Integrity: normal  · Q-tip Test: no pain  · Vaginal Vault Size: within normal limits    [place] PERFECT:  Via: vaginal canal        Strength: P: Power, E: Endurance, R: Repetitions, QF: Quick Flicks, TrA: Transverse Abdominus, T: Timing, DB: Diaphragmatic Breathing  P  2 3   E 2sec 8   R 6 6   QF 7 8   TrA Contraction but unable to coordinate with PF    T DB Able without PFcontraction but limited during kegel                  Palpation:     Right Left   Bulbocavernosus     Ischocavernosus     Superficial Transverse Perineal     Sphincter Urethrae     Compressor Urethra      Urethra-vaginalis     Deep Transverse Perineium     Obturator Internus     Iliococcygeus     Coccygeus     Pubococcygeus     Levator Ani     Adductor tender tender   Psoas     Ant. Abdominal wall             Prolapse:  · Tissue support test with bearing down (Grade 1: not visible at introitus; Grade 2: visible at introitus; Grade 3: tissue outside introitus):  · Anterior Wall = 2   · Posterior Wall = 1   · Apical = 1       Body Structures Involved:  1. Nerves  2. Muscles  3. Ligaments Body Functions Affected:  1. Mental  2. Sensory/Pain  3. Genitourinary  4. Movement Related  5. Skin Related Activities and Participation Affected:  1. Learning and Applying Knowledge  2. General Tasks and Demands  3. Mobility  4. Self Care  5. Interpersonal Interactions and Relationships   Number of elements that affect the Plan of Care: 3: MODERATE COMPLEXITY   CLINICAL PRESENTATION:   Presentation: Stable and uncomplicated: LOW COMPLEXITY   CLINICAL DECISION MAKING:   Outcome Measure: Tool Used: Pelvic Floor Impact Questionnaire--short form 7 (PFIQ-7)   Score:  Initial:   1.59%  · Bladder or Urine: 4.76  · Bowel or Rectum: 0  · Vagina or Pelvis: 0 Most Recent: X (Date: -- )  · Bladder or Urine: X  · Bowel or Rectum: X  · Vagina or Pelvis: X   Interpretation of Score: Each of the 7 sections is scored on a scale from 0-3; 0 representing \"Not at all\", 3 representing \"Quite a bit\". The mean value is taken from all the answered items, then multiplied by 100 to obtain the scale score, ranging from 0-100. This process is repeated for each column representing bowel, bladder, and pelvic pain. ?  Self Care:     - CURRENT STATUS: CI - 1%-19% impaired, limited or restricted    - GOAL STATUS: CH - 0% impaired, limited or restricted    - D/C STATUS:  ---------------To be determined---------------     Medical Necessity:   · Patient demonstrates good rehab potential due to higher previous functional level. Reason for Services/Other Comments:  · Patient continues to require skilled intervention due to ongoing goals noted above . Use of outcome tool(s) and clinical judgement create a POC that gives a: Clear prediction of patient's progress: LOW COMPLEXITY   TREATMENT:   (In addition to Assessment/Re-Assessment sessions the following treatments were rendered)  Pre-treatment Symptoms/Complaints: pt states leaking is improved with exercise but still has issues. No longer a problem with elliptical which was an issue previously. Feels she is unable to perform kegel during intercourse  Pain: Initial:   Pain Intensity 1: 0  Post Session:  0     THERAPEUTIC EXERCISE: (60  minutes):  Exercises per grid below to improve strength and coordination. Required minimal verbal and tactile cues to promote proper body mechanics and promote proper body breathing techniques. Progressed resistance and repetitions as indicated.     Date:  4/11/18 Date:  4/19/19 Date:  5/4/18 Date:  6/6/18   Activity/Exercise Parameters Parameters Parameters    Isometric PFmm biofeedback biofeedback     NMES 10'    home    TA with kegel  5\" 10x reviewed reviewed   Bridge with ball squeeze  10x 2 min    Bridge with isometric ER  BTB 10x BTB  30\" large range; 30\" small    Sidelying clams with TB       squats  10x 10x    Plank front   Hands 15\" x3    Plank side   15\" x3    Squat w one foot on box   10x bilat    Tramp jumps   10\" x3, 30\" x3    TA with marches using BP cuff    5'   TA with heel slides using BP cuff    5'   Bent knee fall outs    3'   Pt ed    pessary   Strength reassessment    20' w instruction on TA w kegel    biofeedback utilized to assess resting tone, 5/10,     (Used abbreviations: MET - muscle energy technique; SCS- Strain counter strain; CTM- Connective tissue mobilizations; CR- Contract/relax; SP- Sustained pressure, TrP- Trigger point release, IASTM- Instrument assisted soft tissue mobilizations, TDN- Trigger point dry needling). HEP Exercises:  Patient instructed in pelvic floor exercises listed below:  toña 3sec hold 10-15reps TID    [employer] Portal    The following educational topics were reviewed with patient:  Bladder health, tips to control urge, bladder diary, pelvic floor anatomy, how foods affect bladder, bladder retraining. Treatment/Session Assessment:    Response to Treatment:  Some difficulty with TA ex's but tolerated well - instructed for home. Pt has BP cuff. Also recommended pt look into pessary for additional assistance during strenuous activities. · Compliance with Program/Exercises: Will assess as treatment progresses. · Recommendations/Intent for next treatment session: \"Next visit will focus on advancements to more challenging activities\".   Total Treatment Duration:  PT Patient Time In/Time Out  Time In: 0900  Time Out: 325 Guilherme Alfredo, PT

## 2018-06-12 ENCOUNTER — HOSPITAL ENCOUNTER (OUTPATIENT)
Dept: PHYSICAL THERAPY | Age: 33
Discharge: HOME | End: 2018-06-12
Attending: OBSTETRICS & GYNECOLOGY
Payer: COMMERCIAL

## 2018-06-12 PROCEDURE — 97110 THERAPEUTIC EXERCISES: CPT

## 2018-06-12 NOTE — PROGRESS NOTES
Zaid Warren  : 1985  Primary: [facility]  Secondary:  [address]  at [place] [address],[address], [facility] [address].  [phone]   [phone]           OUTPATIENT PHYSICAL THERAPY:Daily Note and Progress Report 2018    ICD-10: Treatment Diagnosis: Stress Incontinence (N39.3)   Precautions/Allergies:   Doxycycline and Bactrim [sulfamethoprim ds]   Fall Risk Score: 0 (? 5 = High Risk)  MD Orders: Evaluate and treat  MEDICAL/REFERRING DIAGNOSIS:  Disorder of urinary system, unspecified [N39.9]   DATE OF ONSET: 6 yrs  REFERRING PHYSICIAN: Lisandro Gonzales MD  RETURN PHYSICIAN APPOINTMENT: unknown     INITIAL ASSESSMENT:  Ms. Kristopher Vela presents with decreased strength, endurance, and coordination of PFmm as well as mild laxity of anterior vaginal wall, both likely contributing to her mild SHANNON. She was only able to hold contraction 2 sec at a time today at a 2/5 strength. Pt will benefit from physical therapy to address stated problems. Plan of care was discussed and agreed upon with patient and HEP was initiated. Thank you for the opportunity to work with this patient. PROBLEM LIST (Impacting functional limitations):  1. Decreased strength of pelvic floor which limits bladder control INTERVENTIONS PLANNED:  1. Neuromuscular re-education  2. Biofeedback as needed  3. HEP  4. Bladder retraining  5. Bladder education  6. Manual Therapy  7. Therapeutic Activites  8. Therapeutic Exercise/Strengthening   TREATMENT PLAN:  Effective Dates: 2018 TO 2018 (90 days). Frequency/Duration: 1 time a week for 90 Days  GOALS: (Goals have been discussed and agreed upon with patient.)  Short-Term Functional Goals: Time Frame: 4 weeks  1. Patient will demonstrate independence with home exercise program.  Goal met 18  2. Pt will demonstrate ability to perform kegel in isolation for improved ability to strengthen.   Goal met 18  Discharge Goals: Time Frame: 7/1/2018   1. Pt will score 0 on PFIQ7 for overall functional improvement. 2. Pt will demonstrate improvement in PFmm strength and endurance to perform kegel 10/10 on biofeedback. Goal improved but ongoing 6/6/18  3. Pt will report ability to perform a full day at work without any leaking for decreased social anxiety. Goal ongoing 6/6/18  Rehabilitation Potential For Stated Goals: Good  Regarding Bret Cheung Jonas's therapy, I certify that the treatment plan above will be carried out by a therapist or under their direction. Thank you for this referral,  Javier Aguirre, PT     Referring Physician Signature: Lilliana Jasso MD              Date                    The information in this section was collected on 06/12/18  (except where otherwise noted). HISTORY:   History of Present Injury/Illness (Reason for Referral):  6 year history. Feels like she can't work PFmm correctly. Not needing pads yet. Can't feel leaking just notices underwear a little wet sometimes. Past Medical History/Comorbidities:   Ms. Nnamdi Laguerre  has a past medical history of Arthritis; Cervical cancer ([address] Kidney stones; Neurological disorder; Other ill-defined conditions(799.89); and Psychiatric disorder. She also has no past medical history of Asthma; Autoimmune disease ([address] CAD (coronary artery disease); Cancer ([address] Chronic kidney disease; COPD; DEMENTIA; Diabetes ([address] Endocrine disease; Gastrointestinal disorder; Heart failure ([address] Hypertension; Infectious disease; Liver disease; PUD (peptic ulcer disease); Seizures ([address] Sleep disorder; Stroke [facility]); or Thromboembolus ([address] Ms. Nnamdi Laguerre  has a past surgical history that includes hx gyn and hx tubal ligation. 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         Activity level:works out occasionally  Current Medications:    Current Outpatient Prescriptions:     amoxicillin (AMOXIL) 250 mg capsule, TAKE ONE CAPSULE BY MOUTH TWICE A DAY WITH FOOD, Disp: , Rfl: 0    PARoxetine CR (PAXIL-CR) 37.5 mg tablet, TAKE 1 TABLET EVERY DAY, Disp: , Rfl: 1    buPROPion SR (WELLBUTRIN SR) 150 mg SR tablet, Take 150 mg by mouth., Disp: , Rfl:    Date Last Reviewed:  06/12/18   Voiding Patterns:  Patient voids every few hours during the day and 1-2 times during the night. Patient reports that she empties bladder fully only after she stands up after urinating. Patient uses 0 pads for bladder protection; she changes pads 0 times per day. Fluid Intake:  Patient drinks 2-3 cups of water per day. She drinks primarily diet soda and coffee  She consumes 4-5 cups of caffeinated beverages per day. Patient does not limit fluid intake to control bladder. Bowel Habits:  Patient demonstrates normal bowel habits.   Incontinence History:  PROBLEM: YES/NO: COMMENTS:   Loss of urine with coughing Don't know  usually don't know when leak   Loss of urine with exercise Yes  jump trampoline   Loss of urine with strong urge No     Loss of urine with key in lock or running water No     Have difficulty stopping urine stream No     Experience pain or burning with urination No     Have blood in urine No         Number of Personal Factors/Comorbidities that affect the Plan of Care: 1-2: MODERATE COMPLEXITY   EXAMINATION:   External Observation:   · Voluntary Contraction: present  · Voluntary Relaxation: present  · Perineal Body Assessment: some prior tearing evident  · Skin Integrity: normal  · Q-tip Test: no pain  · Vaginal Vault Size: within normal limits    [place] PERFECT:  Via: vaginal canal        Strength: P: Power, E: Endurance, R: Repetitions, QF: Quick Flicks, TrA: Transverse Abdominus, T: Timing, DB: Diaphragmatic Breathing  P  2 3   E 2sec 8   R 6 6   QF 7 8   TrA Contraction but unable to coordinate with PF    T DB Able without PFcontraction but limited during kegel                  Palpation:     Right Left   Bulbocavernosus     Ischocavernosus     Superficial Transverse Perineal     Sphincter Urethrae     Compressor Urethra      Urethra-vaginalis     Deep Transverse Perineium     Obturator Internus     Iliococcygeus     Coccygeus     Pubococcygeus     Levator Ani     Adductor tender tender   Psoas     Ant. Abdominal wall             Prolapse:  · Tissue support test with bearing down (Grade 1: not visible at introitus; Grade 2: visible at introitus; Grade 3: tissue outside introitus):  · Anterior Wall = 2   · Posterior Wall = 1   · Apical = 1       Body Structures Involved:  1. Nerves  2. Muscles  3. Ligaments Body Functions Affected:  1. Mental  2. Sensory/Pain  3. Genitourinary  4. Movement Related  5. Skin Related Activities and Participation Affected:  1. Learning and Applying Knowledge  2. General Tasks and Demands  3. Mobility  4. Self Care  5. Interpersonal Interactions and Relationships   Number of elements that affect the Plan of Care: 3: MODERATE COMPLEXITY   CLINICAL PRESENTATION:   Presentation: Stable and uncomplicated: LOW COMPLEXITY   CLINICAL DECISION MAKING:   Outcome Measure: Tool Used: Pelvic Floor Impact Questionnaire--short form 7 (PFIQ-7)   Score:  Initial:   1.59%  · Bladder or Urine: 4.76  · Bowel or Rectum: 0  · Vagina or Pelvis: 0 Most Recent: X (Date: -- )  · Bladder or Urine: X  · Bowel or Rectum: X  · Vagina or Pelvis: X   Interpretation of Score: Each of the 7 sections is scored on a scale from 0-3; 0 representing \"Not at all\", 3 representing \"Quite a bit\". The mean value is taken from all the answered items, then multiplied by 100 to obtain the scale score, ranging from 0-100. This process is repeated for each column representing bowel, bladder, and pelvic pain. ?  Self Care:     - CURRENT STATUS: CI - 1%-19% impaired, limited or restricted    - GOAL STATUS: CH - 0% impaired, limited or restricted    - D/C STATUS:  ---------------To be determined---------------     Medical Necessity:   · Patient demonstrates good rehab potential due to higher previous functional level. Reason for Services/Other Comments:  · Patient continues to require skilled intervention due to ongoing goals noted above . Use of outcome tool(s) and clinical judgement create a POC that gives a: Clear prediction of patient's progress: LOW COMPLEXITY   TREATMENT:   (In addition to Assessment/Re-Assessment sessions the following treatments were rendered)  Pre-treatment Symptoms/Complaints: reports improvement in symptoms. Doing estim 2-3x/week. Exercised today w no leaking  Pain: Initial:   Pain Intensity 1: 0  Post Session:  0     THERAPEUTIC EXERCISE: (45  minutes):  Exercises per grid below to improve strength and coordination. Required minimal verbal and tactile cues to promote proper body mechanics and promote proper body breathing techniques. Progressed resistance and repetitions as indicated. 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 HEP Exercises:  Patient instructed in pelvic floor exercises listed below:  toña 3sec hold 10-15reps TID    Access Code: [email or web address]   URL: [email or web address]   Date: 06/12/2018   Prepared by: Pilar Bernard   Exercises  Supine Bridge with Mini [place] park Between Knees  Bridge with Hip Abduction and Resistance  Clamshell with Resistance  Sidestepping with Pelvic Floor Contraction  Squat  TA with Heel Slide  Side Plank on Knees  Full Plank    The following educational topics were reviewed with patient:  Bladder health, tips to control urge, bladder diary, pelvic floor anatomy, how foods affect bladder, bladder retraining. Treatment/Session Assessment:    · Response to Treatment:  Good tolerance to all ex's. Progressing well with strengthening. Plan to hold chart 4 weeks then d/c to HEP if pt does not schedule another appt. · Compliance with Program/Exercises: Will assess as treatment progresses. · Recommendations/Intent for next treatment session: \"Next visit will focus on advancements to more challenging activities\".   Total Treatment Duration:  PT Patient Time In/Time Out  Time In: 1600  Time Out: 1700    Gi Goldman, ALBA

## 2019-09-06 PROBLEM — F41.9 ANXIETY: Status: ACTIVE | Noted: 2019-09-06

## 2019-09-06 PROBLEM — C53.9 CERVICAL CANCER (HCC): Status: RESOLVED | Noted: 2019-09-06 | Resolved: 2019-09-06

## 2019-09-06 PROBLEM — Z00.00 PHYSICAL EXAM: Status: ACTIVE | Noted: 2019-09-06

## 2019-09-06 PROBLEM — F33.9 EPISODE OF RECURRENT MAJOR DEPRESSIVE DISORDER (HCC): Status: ACTIVE | Noted: 2019-09-06

## 2019-09-06 PROBLEM — Z87.410 HISTORY OF CERVICAL DYSPLASIA: Status: ACTIVE | Noted: 2019-09-06

## 2019-09-06 PROBLEM — Z23 ENCOUNTER FOR IMMUNIZATION: Status: ACTIVE | Noted: 2019-09-06

## 2020-11-09 ENCOUNTER — ANESTHESIA EVENT (OUTPATIENT)
Dept: SURGERY | Age: 35
End: 2020-11-09
Payer: COMMERCIAL

## 2020-11-10 ENCOUNTER — ANESTHESIA (OUTPATIENT)
Dept: SURGERY | Age: 35
End: 2020-11-10
Payer: COMMERCIAL

## 2020-11-10 ENCOUNTER — HOSPITAL ENCOUNTER (OUTPATIENT)
Age: 35
Setting detail: OUTPATIENT SURGERY
Discharge: HOME | End: 2020-11-10
Attending: SURGERY | Admitting: SURGERY
Payer: COMMERCIAL

## 2020-11-10 VITALS
RESPIRATION RATE: 16 BRPM | BODY MASS INDEX: 27.46 KG/M2 | DIASTOLIC BLOOD PRESSURE: 67 MMHG | TEMPERATURE: 99.5 F | SYSTOLIC BLOOD PRESSURE: 133 MMHG | WEIGHT: 160 LBS | HEART RATE: 112 BPM | OXYGEN SATURATION: 94 %

## 2020-11-10 DIAGNOSIS — L76.82 PAIN AT SURGICAL INCISION: Primary | ICD-10-CM

## 2020-11-10 LAB — HGB BLD-MCNC: 13.8 G/DL (ref 11.7–15.4)

## 2020-11-10 PROCEDURE — 77030002916 HC SUT ETHLN J&J -A: Performed by: SURGERY

## 2020-11-10 PROCEDURE — 74011250637 HC RX REV CODE- 250/637: Performed by: STUDENT IN AN ORGANIZED HEALTH CARE EDUCATION/TRAINING PROGRAM

## 2020-11-10 PROCEDURE — 77030041445 HC PENCL ELECT SMK EVAC STRY -B: Performed by: SURGERY

## 2020-11-10 PROCEDURE — 2709999900 HC NON-CHARGEABLE SUPPLY: Performed by: SURGERY

## 2020-11-10 PROCEDURE — 77030019908 HC STETH ESOPH SIMS -A: Performed by: REGISTERED NURSE

## 2020-11-10 PROCEDURE — 76210000026 HC REC RM PH II 1 TO 1.5 HR: Performed by: SURGERY

## 2020-11-10 PROCEDURE — 85018 HEMOGLOBIN: CPT

## 2020-11-10 PROCEDURE — 77030040361 HC SLV COMPR DVT MDII -B: Performed by: SURGERY

## 2020-11-10 PROCEDURE — 76210000063 HC OR PH I REC FIRST 0.5 HR: Performed by: SURGERY

## 2020-11-10 PROCEDURE — 77030040922 HC BLNKT HYPOTHRM STRY -A: Performed by: REGISTERED NURSE

## 2020-11-10 PROCEDURE — 74011000250 HC RX REV CODE- 250: Performed by: REGISTERED NURSE

## 2020-11-10 PROCEDURE — 76010000174 HC OR TIME 3.5 TO 4 HR INTENSV-TIER 1: Performed by: SURGERY

## 2020-11-10 PROCEDURE — 74011250636 HC RX REV CODE- 250/636: Performed by: SURGERY

## 2020-11-10 PROCEDURE — 74011250636 HC RX REV CODE- 250/636: Performed by: REGISTERED NURSE

## 2020-11-10 PROCEDURE — 77030033138 HC SUT PGA STRATFX J&J -B: Performed by: SURGERY

## 2020-11-10 PROCEDURE — 77030008534 HC TBNG LIPOSUC BYRO -B: Performed by: SURGERY

## 2020-11-10 PROCEDURE — 74011250636 HC RX REV CODE- 250/636: Performed by: STUDENT IN AN ORGANIZED HEALTH CARE EDUCATION/TRAINING PROGRAM

## 2020-11-10 PROCEDURE — 77030031139 HC SUT VCRL2 J&J -A: Performed by: SURGERY

## 2020-11-10 PROCEDURE — 77030039425 HC BLD LARYNG TRULITE DISP TELE -A: Performed by: REGISTERED NURSE

## 2020-11-10 PROCEDURE — 88307 TISSUE EXAM BY PATHOLOGIST: CPT

## 2020-11-10 PROCEDURE — 77030037088 HC TUBE ENDOTRACH ORAL NSL COVD-A: Performed by: REGISTERED NURSE

## 2020-11-10 PROCEDURE — 77030003666 HC NDL SPINAL BD -A: Performed by: SURGERY

## 2020-11-10 PROCEDURE — 74011000250 HC RX REV CODE- 250: Performed by: SURGERY

## 2020-11-10 PROCEDURE — 76060000038 HC ANESTHESIA 3.5 TO 4 HR: Performed by: SURGERY

## 2020-11-10 PROCEDURE — 77030013629 HC ELECTRD NDL STRY -B: Performed by: SURGERY

## 2020-11-10 RX ORDER — NEOSTIGMINE METHYLSULFATE 1 MG/ML
INJECTION, SOLUTION INTRAVENOUS AS NEEDED
Status: DISCONTINUED | OUTPATIENT
Start: 2020-11-10 | End: 2020-11-10 | Stop reason: HOSPADM

## 2020-11-10 RX ORDER — CEFAZOLIN SODIUM/WATER 2 G/20 ML
2 SYRINGE (ML) INTRAVENOUS ONCE
Status: COMPLETED | OUTPATIENT
Start: 2020-11-10 | End: 2020-11-10

## 2020-11-10 RX ORDER — HYDROCODONE BITARTRATE AND ACETAMINOPHEN 5; 325 MG/1; MG/1
1 TABLET ORAL
Qty: 30 TAB | Refills: 0 | Status: SHIPPED | OUTPATIENT
Start: 2020-11-10 | End: 2020-11-13

## 2020-11-10 RX ORDER — SODIUM CHLORIDE, SODIUM LACTATE, POTASSIUM CHLORIDE, CALCIUM CHLORIDE 600; 310; 30; 20 MG/100ML; MG/100ML; MG/100ML; MG/100ML
100 INJECTION, SOLUTION INTRAVENOUS CONTINUOUS
Status: DISCONTINUED | OUTPATIENT
Start: 2020-11-10 | End: 2020-11-10 | Stop reason: HOSPADM

## 2020-11-10 RX ORDER — SODIUM CHLORIDE 0.9 % (FLUSH) 0.9 %
5-40 SYRINGE (ML) INJECTION EVERY 8 HOURS
Status: DISCONTINUED | OUTPATIENT
Start: 2020-11-10 | End: 2020-11-10 | Stop reason: HOSPADM

## 2020-11-10 RX ORDER — SODIUM CHLORIDE 0.9 % (FLUSH) 0.9 %
5-40 SYRINGE (ML) INJECTION AS NEEDED
Status: DISCONTINUED | OUTPATIENT
Start: 2020-11-10 | End: 2020-11-10 | Stop reason: HOSPADM

## 2020-11-10 RX ORDER — ROCURONIUM BROMIDE 10 MG/ML
INJECTION, SOLUTION INTRAVENOUS AS NEEDED
Status: DISCONTINUED | OUTPATIENT
Start: 2020-11-10 | End: 2020-11-10 | Stop reason: HOSPADM

## 2020-11-10 RX ORDER — DIPHENHYDRAMINE HYDROCHLORIDE 50 MG/ML
12.5 INJECTION, SOLUTION INTRAMUSCULAR; INTRAVENOUS
Status: DISCONTINUED | OUTPATIENT
Start: 2020-11-10 | End: 2020-11-10 | Stop reason: HOSPADM

## 2020-11-10 RX ORDER — EPINEPHRINE 1 MG/ML
INJECTION, SOLUTION, CONCENTRATE INTRAVENOUS AS NEEDED
Status: DISCONTINUED | OUTPATIENT
Start: 2020-11-10 | End: 2020-11-10 | Stop reason: HOSPADM

## 2020-11-10 RX ORDER — ACETAMINOPHEN 500 MG
1000 TABLET ORAL ONCE
Status: COMPLETED | OUTPATIENT
Start: 2020-11-10 | End: 2020-11-10

## 2020-11-10 RX ORDER — OXYCODONE HYDROCHLORIDE 5 MG/1
5 TABLET ORAL
Status: COMPLETED | OUTPATIENT
Start: 2020-11-10 | End: 2020-11-10

## 2020-11-10 RX ORDER — HYDROMORPHONE HYDROCHLORIDE 2 MG/ML
0.5 INJECTION, SOLUTION INTRAMUSCULAR; INTRAVENOUS; SUBCUTANEOUS
Status: DISCONTINUED | OUTPATIENT
Start: 2020-11-10 | End: 2020-11-10 | Stop reason: HOSPADM

## 2020-11-10 RX ORDER — NALOXONE HYDROCHLORIDE 0.4 MG/ML
0.1 INJECTION, SOLUTION INTRAMUSCULAR; INTRAVENOUS; SUBCUTANEOUS AS NEEDED
Status: DISCONTINUED | OUTPATIENT
Start: 2020-11-10 | End: 2020-11-10 | Stop reason: HOSPADM

## 2020-11-10 RX ORDER — LIDOCAINE HYDROCHLORIDE 10 MG/ML
0.1 INJECTION INFILTRATION; PERINEURAL AS NEEDED
Status: DISCONTINUED | OUTPATIENT
Start: 2020-11-10 | End: 2020-11-10 | Stop reason: HOSPADM

## 2020-11-10 RX ORDER — MIDAZOLAM HYDROCHLORIDE 1 MG/ML
2 INJECTION, SOLUTION INTRAMUSCULAR; INTRAVENOUS
Status: COMPLETED | OUTPATIENT
Start: 2020-11-10 | End: 2020-11-10

## 2020-11-10 RX ORDER — FENTANYL CITRATE 50 UG/ML
INJECTION, SOLUTION INTRAMUSCULAR; INTRAVENOUS AS NEEDED
Status: DISCONTINUED | OUTPATIENT
Start: 2020-11-10 | End: 2020-11-10 | Stop reason: HOSPADM

## 2020-11-10 RX ORDER — DEXAMETHASONE SODIUM PHOSPHATE 4 MG/ML
INJECTION, SOLUTION INTRA-ARTICULAR; INTRALESIONAL; INTRAMUSCULAR; INTRAVENOUS; SOFT TISSUE AS NEEDED
Status: DISCONTINUED | OUTPATIENT
Start: 2020-11-10 | End: 2020-11-10 | Stop reason: HOSPADM

## 2020-11-10 RX ORDER — ONDANSETRON 2 MG/ML
INJECTION INTRAMUSCULAR; INTRAVENOUS AS NEEDED
Status: DISCONTINUED | OUTPATIENT
Start: 2020-11-10 | End: 2020-11-10 | Stop reason: HOSPADM

## 2020-11-10 RX ORDER — PROPOFOL 10 MG/ML
INJECTION, EMULSION INTRAVENOUS AS NEEDED
Status: DISCONTINUED | OUTPATIENT
Start: 2020-11-10 | End: 2020-11-10 | Stop reason: HOSPADM

## 2020-11-10 RX ORDER — APREPITANT 40 MG/1
40 CAPSULE ORAL ONCE
Status: COMPLETED | OUTPATIENT
Start: 2020-11-10 | End: 2020-11-10

## 2020-11-10 RX ORDER — HYDROMORPHONE HYDROCHLORIDE 2 MG/ML
INJECTION, SOLUTION INTRAMUSCULAR; INTRAVENOUS; SUBCUTANEOUS AS NEEDED
Status: DISCONTINUED | OUTPATIENT
Start: 2020-11-10 | End: 2020-11-10 | Stop reason: HOSPADM

## 2020-11-10 RX ORDER — FLUMAZENIL 0.1 MG/ML
0.2 INJECTION INTRAVENOUS
Status: DISCONTINUED | OUTPATIENT
Start: 2020-11-10 | End: 2020-11-10 | Stop reason: HOSPADM

## 2020-11-10 RX ORDER — EPHEDRINE SULFATE/0.9% NACL/PF 50 MG/5 ML
SYRINGE (ML) INTRAVENOUS AS NEEDED
Status: DISCONTINUED | OUTPATIENT
Start: 2020-11-10 | End: 2020-11-10 | Stop reason: HOSPADM

## 2020-11-10 RX ORDER — SODIUM CHLORIDE, SODIUM LACTATE, POTASSIUM CHLORIDE, CALCIUM CHLORIDE 600; 310; 30; 20 MG/100ML; MG/100ML; MG/100ML; MG/100ML
999 INJECTION, SOLUTION INTRAVENOUS ONCE
Status: COMPLETED | OUTPATIENT
Start: 2020-11-10 | End: 2020-11-10

## 2020-11-10 RX ORDER — GLYCOPYRROLATE 0.2 MG/ML
INJECTION INTRAMUSCULAR; INTRAVENOUS AS NEEDED
Status: DISCONTINUED | OUTPATIENT
Start: 2020-11-10 | End: 2020-11-10 | Stop reason: HOSPADM

## 2020-11-10 RX ORDER — LIDOCAINE HYDROCHLORIDE 20 MG/ML
INJECTION, SOLUTION EPIDURAL; INFILTRATION; INTRACAUDAL; PERINEURAL AS NEEDED
Status: DISCONTINUED | OUTPATIENT
Start: 2020-11-10 | End: 2020-11-10 | Stop reason: HOSPADM

## 2020-11-10 RX ORDER — LIDOCAINE HYDROCHLORIDE 10 MG/ML
INJECTION INFILTRATION; PERINEURAL AS NEEDED
Status: DISCONTINUED | OUTPATIENT
Start: 2020-11-10 | End: 2020-11-10 | Stop reason: HOSPADM

## 2020-11-10 RX ADMIN — FENTANYL CITRATE 100 MCG: 50 INJECTION INTRAMUSCULAR; INTRAVENOUS at 07:42

## 2020-11-10 RX ADMIN — SODIUM CHLORIDE, SODIUM LACTATE, POTASSIUM CHLORIDE, AND CALCIUM CHLORIDE: 600; 310; 30; 20 INJECTION, SOLUTION INTRAVENOUS at 09:45

## 2020-11-10 RX ADMIN — Medication 5 MG: at 09:35

## 2020-11-10 RX ADMIN — OXYCODONE 5 MG: 5 TABLET ORAL at 11:31

## 2020-11-10 RX ADMIN — SODIUM CHLORIDE, SODIUM LACTATE, POTASSIUM CHLORIDE, AND CALCIUM CHLORIDE 100 ML/HR: 600; 310; 30; 20 INJECTION, SOLUTION INTRAVENOUS at 06:02

## 2020-11-10 RX ADMIN — Medication 1 MG: at 10:51

## 2020-11-10 RX ADMIN — HYDROMORPHONE HYDROCHLORIDE 0.5 MG: 2 INJECTION INTRAMUSCULAR; INTRAVENOUS; SUBCUTANEOUS at 09:09

## 2020-11-10 RX ADMIN — SODIUM CHLORIDE, SODIUM LACTATE, POTASSIUM CHLORIDE, AND CALCIUM CHLORIDE 999 ML/HR: 600; 310; 30; 20 INJECTION, SOLUTION INTRAVENOUS at 12:04

## 2020-11-10 RX ADMIN — ONDANSETRON 4 MG: 2 INJECTION INTRAMUSCULAR; INTRAVENOUS at 10:49

## 2020-11-10 RX ADMIN — Medication 5 MG: at 08:50

## 2020-11-10 RX ADMIN — Medication 5 MG: at 10:27

## 2020-11-10 RX ADMIN — HYDROMORPHONE HYDROCHLORIDE 0.1 MG: 2 INJECTION INTRAMUSCULAR; INTRAVENOUS; SUBCUTANEOUS at 11:07

## 2020-11-10 RX ADMIN — MIDAZOLAM 2 MG: 1 INJECTION INTRAMUSCULAR; INTRAVENOUS at 07:33

## 2020-11-10 RX ADMIN — PROPOFOL 200 MG: 10 INJECTION, EMULSION INTRAVENOUS at 07:43

## 2020-11-10 RX ADMIN — ACETAMINOPHEN 1000 MG: 500 TABLET, FILM COATED ORAL at 06:02

## 2020-11-10 RX ADMIN — APREPITANT 40 MG: 40 CAPSULE ORAL at 06:02

## 2020-11-10 RX ADMIN — DEXAMETHASONE SODIUM PHOSPHATE 10 MG: 4 INJECTION, SOLUTION INTRAMUSCULAR; INTRAVENOUS at 07:52

## 2020-11-10 RX ADMIN — Medication 2 G: at 07:52

## 2020-11-10 RX ADMIN — GLYCOPYRROLATE 0.1 MG: 0.2 INJECTION, SOLUTION INTRAMUSCULAR; INTRAVENOUS at 10:51

## 2020-11-10 RX ADMIN — HYDROMORPHONE HYDROCHLORIDE 0.4 MG: 2 INJECTION INTRAMUSCULAR; INTRAVENOUS; SUBCUTANEOUS at 11:16

## 2020-11-10 RX ADMIN — ROCURONIUM BROMIDE 50 MG: 10 INJECTION, SOLUTION INTRAVENOUS at 07:44

## 2020-11-10 RX ADMIN — HYDROMORPHONE HYDROCHLORIDE 0.4 MG: 2 INJECTION INTRAMUSCULAR; INTRAVENOUS; SUBCUTANEOUS at 08:13

## 2020-11-10 RX ADMIN — LIDOCAINE HYDROCHLORIDE 80 MG: 20 INJECTION, SOLUTION EPIDURAL; INFILTRATION; INTRACAUDAL; PERINEURAL at 07:42

## 2020-11-10 RX ADMIN — Medication 10 MG: at 08:28

## 2020-11-10 NOTE — DISCHARGE INSTRUCTIONS
Follow all orders given to you by Dr Kennedi Medrano          Breast Reduction Surgery: What to Expect at [address] [facility]  Breast reduction surgery removes some of the breast tissue and skin from the breasts. This reshapes and lifts the breasts and reduces their size. It can also make the dark area around the nipple smaller. After surgery, you will probably feel weak. You may feel sore for 2 to 3 weeks. You also may feel pulling or stretching in your breast area. Although you may need pain medicine for a week or two, you can expect to feel better and stronger each day. For several weeks, you may get tired easily or have less energy than usual. You also may have the feeling that fluid is moving in your breasts. This feeling is normal and will go away over time. Stitches usually are removed in 5 to 10 days. Your new breasts may feel firmer and look rounder. Breast reduction may change the normal feeling in your breast. But in time, some feeling may return. Keep in mind that it may take time to get used to your new breasts. You will have swelling at first. But the breasts will soften and develop better shape over time. This care sheet gives you a general idea about how long it will take for you to recover. But each person recovers at a different pace. Follow the steps below to get better as quickly as possible. How can you care for yourself at home? Activity    · Rest when you feel tired. Getting enough sleep will help you recover.     · For about 2 weeks after surgery, avoid lifting anything that would make you strain. This may include heavy grocery bags and milk containers, a heavy briefcase or backpack, cat litter or dog food bags, a vacuum , or a child. Do not lift anything over your head for 2 to 3 weeks.     · Ask your doctor when you can drive again.     · Ask your doctor when it is okay for you to have sex.     · You can take your first shower the day after your drain or bandage is removed.  This is usually within about 1 week. Sometimes doctors say it is okay to shower the day after surgery. Do not take a bath or soak in a hot tub for about 4 weeks.     · You will probably be able to go back to work or your normal routine in 2 to 3 weeks. This depends on the type of work you do and any further treatment. Diet    · You can eat your normal diet. If your stomach is upset, try bland, low-fat foods like plain rice, broiled chicken, toast, and yogurt.     · Drink plenty of fluids (unless your doctor tells you not to).     · You may notice that your bowel movements are not regular right after your surgery. This is common. Try to avoid constipation and straining with bowel movements. Take a fiber supplement. If you have not had a bowel movement after a couple of days, take a mild laxative. Medicines    · Your doctor will tell you if and when you can restart your medicines. He or she will also give you instructions about taking any new medicines.     · If you take aspirin or some other blood thinner, ask your doctor if and when to start taking it again. Make sure that you understand exactly what your doctor wants you to do.     · Take pain medicines exactly as directed. ? If the doctor gave you a prescription medicine for pain, take it as prescribed. ? If you are not taking a prescription pain medicine, ask your doctor if you can take an over-the-counter medicine.     · If you think your pain medicine is making you sick to your stomach:  ? Take your medicine after meals (unless your doctor has told you not to). ? Ask your doctor for a different pain medicine.     · If you were given medicine for nausea, take it as directed.     · If your doctor prescribed antibiotics, take them as directed. Do not stop taking them just because you feel better. You need to take the full course of antibiotics.    Incision care    · If your doctor gave you specific instructions on how to care for your incision, follow those instructions.     · You may be wearing a special bra that holds your bandages in place after the surgery. Your doctor will tell you when you can stop wearing the bra. Your doctor may want you to wear the bra at night as well as during the day for several weeks. Do not wear an underwire bra for 1 month.     · If you have strips of tape on your incision, leave the tape on for a week or until it falls off. Or follow your doctor's instructions for removing the tape.     · Wash the area daily with warm, soapy water, and pat it dry. Don't use hydrogen peroxide or alcohol, which can slow healing.     · You may cover the area with a gauze bandage if it weeps or rubs against clothing. Change the bandage every day. Consider having someone help you with this. Exercise    · Try to walk each day. Start by walking a little more than you did the day before. Bit by bit, increase the amount you walk. Walking boosts blood flow and helps prevent pneumonia and constipation.     · Avoid strenuous activities, such as bicycle riding, jogging, weight lifting, or aerobic exercise, until your doctor says it is okay.     · Your doctor will tell you when to begin stretching exercises and normal activities. Ice    · Put ice or a cold pack over your breast for 10 to 20 minutes at a time. Try to do this every 1 to 2 hours for the next 3 days (when you are awake) or until the swelling goes down. Put a thin cloth between the ice and your skin. Other instructions    · You may have one or more drains near your incisions. Your doctor will tell you how to take care of them. Drains are usually removed in the first week after surgery. Follow-up care is a key part of your treatment and safety. Be sure to make and go to all appointments, and call your doctor if you are having problems. It's also a good idea to know your test results and keep a list of the medicines you take. When should you call for help?    Call 911 anytime you think you may need emergency care. For example, call if:    · You passed out (lost consciousness).     · You have sudden chest pain and shortness of breath, or you cough up blood. Call your doctor now or seek immediate medical care if:    · You have pain that does not get better after you take pain medicine.     · You have loose stitches, or your incision comes open.     · You are bleeding from the incision.     · You have signs of infection, such as:  ? Increased pain, swelling, warmth, or redness. ? Red streaks leading from the incision. ? Pus draining from the incision. ? A fever.     · You have signs of a blood clot in your leg, such as:  ? Pain in your calf, back of the knee, thigh, or groin. ? Redness and swelling in your leg or groin. Watch closely for any changes in your health, and be sure to contact your doctor if:    · You do not get better as expected. Where can you learn more? Go to http://www.gray.com/  Enter T113 in the search box to learn more about \"Breast Reduction Surgery: What to Expect at Home. \"  Current as of: July 2, 2020               Content Version: 12.6  © 4863-4801 [employer]. Care instructions adapted under license by [employer] (which disclaims liability or warranty for this information). If you have questions about a medical condition or this instruction, always ask your healthcare professional. [address] any warranty or liability for your use of this information. ACTIVITY  · As tolerated and as directed by your doctor. · Bathe or shower as directed by your doctor. DIET  · Clear liquids until no nausea or vomiting; then light diet for the first day. · Advance to regular diet on second day, unless your doctor orders otherwise. · If nausea and vomiting continues, call your doctor. PAIN  · Take pain medication as directed by your doctor. · Call your doctor if pain is NOT relieved by medication. · DO NOT take aspirin of blood thinners unless directed by your doctor. CALL YOUR DOCTOR IF   · Excessive bleeding that does not stop after holding pressure over the area  · Temperature of 101 degrees F or above  · Excessive redness, swelling or bruising, and/ or green or yellow, smelly discharge from incision    AFTER ANESTHESIA   · For the first 24 hours: DO NOT Drive, Drink alcoholic beverages, or Make important decisions. · Be aware of dizziness following anesthesia and while taking pain medication. DISCHARGE SUMMARY from Nurse    PATIENT INSTRUCTIONS:    After general anesthesia or intravenous sedation, for 24 hours or while taking prescription Narcotics:  · Limit your activities  · Do not drive and operate hazardous machinery  · Do not make important personal or business decisions  · Do  not drink alcoholic beverages  · If you have not urinated within 8 hours after discharge, please contact your surgeon on call. *  Please give a list of your current medications to your Primary Care Provider. *  Please update this list whenever your medications are discontinued, doses are      changed, or new medications (including over-the-counter products) are added. *  Please carry medication information at all times in case of emergency situations. These are general instructions for a healthy lifestyle:    No smoking/ No tobacco products/ Avoid exposure to second hand smoke    Surgeon General's Warning:  Quitting smoking now greatly reduces serious risk to your health.     Obesity, smoking, and sedentary lifestyle greatly increases your risk for illness    A healthy diet, regular physical exercise & weight monitoring are important for maintaining a healthy lifestyle    You may be retaining fluid if you have a history of heart failure or if you experience any of the following symptoms:  Weight gain of 3 pounds or more overnight or 5 pounds in a week, increased swelling in our hands or feet or shortness of breath while lying flat in bed. Please call your doctor as soon as you notice any of these symptoms; do not wait until your next office visit. Recognize signs and symptoms of STROKE:    F-face looks uneven    A-arms unable to move or move unevenly    S-speech slurred or non-existent    T-time-call 911 as soon as signs and symptoms begin-DO NOT go       Back to bed or wait to see if you get better-TIME IS BRAIN. Learning About COVID-19 and Social Distancing  What is it? Social distancing means putting space between yourself and other people. The recommended distance is 6 feet, or about 2 meters. This also means staying away from any place where people may gather, such as [place] or other public gathering places. Why is it important? Social distancing is the best way to reduce the spread of COVID-19. This virus seems to spread from person to person through droplets from coughing and sneezing. So if you keep your distance from others, you're less likely to get it or spread it. And social distancing is important for everyone, not just those who are at high risk of infection, like older people. You might have the virus but not have symptoms. You could then give the infection to someone you come into contact with. How is it done? Putting 6 feet, or about 2 meters, between you and other people is the recommended distance. Also stay away from any place where people may gather, such as [place] or other public gathering places. So if possible:  · Work from home, and keep your kids at home. · Don't travel if you don't have to. And avoid public transportation, ride-shares, and taxis unless you have no choice. · Limit shopping to essentials, like food and medicines. · Wear a cloth face cover if you have to go to a public place like the grocery store or pharmacy. · Don't eat in restaurants. (You can still get takeout or food deliveries.)  · Avoid crowds and busy places.  Follow stay-at-home orders or other directions for your area. Current as of: July 10, 2020               Content Version: 12.6  © 8589-3670 [employer]. Care instructions adapted under license by [employer] (which disclaims liability or warranty for this information). If you have questions about a medical condition or this instruction, always ask your healthcare professional. [place] [address] any warranty or liability for your use of this information.

## 2020-11-10 NOTE — PERIOP NOTES
Bolus has infused, Dr Avis Huynh aware of resting HR since bolus, ok to dc to home.  Pt states she feels ready for DC

## 2020-11-10 NOTE — PERIOP NOTES
Dr Shobha Toscano called to bedside regarding -121, assessed pt, has 600 cc left of LR, to bolus her with remaining fluid

## 2020-11-10 NOTE — H&P
CC: Symptomatic macromastia    HPI:  28 yo with longstanding history of large breast size and associated neck and back pain. Past Medical History:   Diagnosis Date    Arthritis     DDD    Cervical cancer ([address]     pt denies- cervix dysplasia    Cervical cancer (HCC)     Kidney stones     Neurological disorder     migraines    Other ill-defined conditions(799.89)     bulgig discs, scoliosis    Psychiatric disorder     depression and anxiety     Past Surgical History:   Procedure Laterality Date    HX GYN      cervical cone bx    HX TUBAL LIGATION       Visit Vitals  BP (!) 147/83 (BP 1 Location: Left arm, BP Patient Position: Supine)   Pulse 88   Temp 98.2 °F (36.8 °C)   Resp 18   Wt 72.6 kg (160 lb)   SpO2 100%   BMI 27.46 kg/m²     A&O x3  RRR  Resp clear  Marked for wise pattern reduction    A/P  Will proceed with wise pattern breast reduction. Risks, benefits, alternatives discussed. Patient would like to proceed.

## 2020-11-12 NOTE — OP NOTES
Operative Note    Patient: Brandon Buckner  YOB: 1985  MRN: [id number]    Date of Procedure: 11/10/2020     Pre-Op Diagnosis:   Neck pain [M54.2]  Chronic low back pain, unspecified back pain laterality, unspecified whether sciatica present [M54.5, G89.29]  Hypertrophy of breast [N62]    Post-Op Diagnosis:   Same    Prior to procedure informed consent obtained from patient follow discussion of risks, benefits and alternatives including but not limited to asymmetry, deformity, pain, need for further surgery, infection, bleeding, tissue loss. Wise pattern reduction marked in holding with patient in the upright position marking the midline, breast meridian, lateral and medial breast margins and new NAC position at the at the level of the [facility]     Patient was brought to the OR. General anesthesia induced. Surgical timeout performed. Previous marks reinforced. 100 ml of 0.25% Lidocaine with epi infiltrated on the right.     Attention first turned to the right breast. All of the marked incisions were scored and the pedicle de-epithelialized. Incisions then made full thickness. Central mound inferior pedicle developed sharply to the chest wall. Lateral and then medial flap thinned sharply.      Keyhole then sharply excised. Specimen passed off the field. Pedicle then trimmed taking care to maintain lateral chest wall tissue as well as NAC vascularity. Wound irrigated and hemostasis obtained with electrocautery. Temporary closure obtained with staples.    360g of tissue excised on the right.     Mirror image procedure performed on the left side.      478g of tissue excised on the left.     Patient was positioned upright on the OR table and volume/symmetry re-assessed and found to be excellent. Staples were removed and again surgical field irrigated with warm NS prior to closure.  Hemostasis obtained with cautery.      Closure was completed bilaterally with: intermittent 3-0 vicryls at the inferior T and horizontal, cardinal points of the NAC. Running deep 3-0 vicryl at the IMF. Running 3-0 monoderm quill intracuticular completed the closure. NAC perfusion re-assessed following closure and without immediate compromise.     Formal breast dressing applied over xeroform on incisions.       Procedure(s):  BILATERAL BREAST REDUCTION    Surgeon(s):  Nely Story MD    Surgical Assistant: None    Anesthesia: General     Estimated Blood Loss (mL): 292    Complications: None immediate    Specimens:   ID Type Source Tests Collected by Time Destination   1 : right breast Fresh Breast  Maggie Gooden MD 11/10/2020 [facility]   2 : left breast Fresh Breast  Maggie Gooden MD 11/10/2020 1059 Pathology        Drains: * No LDAs found *    Findings: normal appearing breast tissue bilaterally

## 2020-11-13 RX ORDER — OXYCODONE AND ACETAMINOPHEN 5; 325 MG/1; MG/1
1 TABLET ORAL
Qty: 30 TAB | Refills: 0 | Status: SHIPPED | OUTPATIENT
Start: 2020-11-13 | End: 2020-11-16

## 2022-03-18 PROBLEM — N89.8 VAGINAL DISCHARGE: Status: ACTIVE | Noted: 2017-07-31

## 2022-03-18 PROBLEM — N93.9 MENSTRUAL BLEEDING PROBLEM: Status: ACTIVE | Noted: 2017-02-13

## 2022-03-19 PROBLEM — Z87.410 HISTORY OF CERVICAL DYSPLASIA: Status: ACTIVE | Noted: 2019-09-06

## 2022-03-19 PROBLEM — Z23 ENCOUNTER FOR IMMUNIZATION: Status: ACTIVE | Noted: 2019-09-06

## 2022-08-25 RX ORDER — LEVOTHYROXINE SODIUM 0.03 MG/1
25 TABLET ORAL NIGHTLY
COMMUNITY

## 2022-08-25 RX ORDER — AMLODIPINE BESYLATE 10 MG/1
10 TABLET ORAL DAILY
COMMUNITY

## 2022-08-25 RX ORDER — CHOLECALCIFEROL (VITAMIN D3) 1250 MCG
CAPSULE ORAL DAILY
COMMUNITY

## 2022-08-25 NOTE — PRE-PROCEDURE INSTRUCTIONS
Patient verified name and . Order for consent was not found in EHR ; patient verifies procedure. Type IB surgery, phone assessment complete. Orders were not received. Labs per surgeon: none received. Labs per anesthesia protocol: POC Potassium. Patient answered medical/surgical history questions at their best of ability. All prior to admission medications documented in [facility]. Patient instructed to take the following medications the day of surgery according to anesthesia guidelines with a small sip of water: Amlodipine, Bupropion. On the day before surgery please take Acetaminophen 1000mg in the morning and then again before bed. You may substitute for Tylenol 650 mg. Hold all vitamins 7 days prior to surgery and NSAIDS 5 days prior to surgery. Prescription meds to hold:Furosemide. Patient instructed on the following:    > Arrive at Outpatient Entrance, time of arrival to be called the day before by 1700  > NPO after midnight, unless otherwise indicated, including gum, mints, and ice chips  > Responsible adult must drive patient to the hospital, stay during surgery, and patient will need supervision 24 hours after anesthesia  > Use antibacterial soap in shower the night before surgery and on the morning of surgery  > All piercings must be removed prior to arrival.    > Leave all valuables (money and jewelry) at home but bring insurance card and ID on DOS.   > You may be required to pay a deductible or co-pay on the day of your procedure. You can pre-pay by calling [phone] if your surgery is at the [facility] or [phone] if your surgery is at the [facility]. > Do not wear make-up, nail polish, lotions, cologne, perfumes, powders, or oil on skin. Artificial nails are not permitted.

## 2022-08-30 ENCOUNTER — ANESTHESIA (OUTPATIENT)
Dept: SURGERY | Age: 37
End: 2022-08-30
Payer: COMMERCIAL

## 2022-08-30 ENCOUNTER — ANESTHESIA EVENT (OUTPATIENT)
Dept: SURGERY | Age: 37
End: 2022-08-30
Payer: COMMERCIAL

## 2022-08-30 ENCOUNTER — HOSPITAL ENCOUNTER (OUTPATIENT)
Age: 37
Setting detail: OUTPATIENT SURGERY
Discharge: HOME | End: 2022-08-30
Attending: SURGERY | Admitting: SURGERY
Payer: COMMERCIAL

## 2022-08-30 VITALS
OXYGEN SATURATION: 87 % | DIASTOLIC BLOOD PRESSURE: 78 MMHG | BODY MASS INDEX: 31.01 KG/M2 | HEIGHT: 63 IN | TEMPERATURE: 98.1 F | HEART RATE: 111 BPM | SYSTOLIC BLOOD PRESSURE: 123 MMHG | WEIGHT: 175 LBS | RESPIRATION RATE: 16 BRPM

## 2022-08-30 DIAGNOSIS — G89.18 POST-OP PAIN: Primary | ICD-10-CM

## 2022-08-30 LAB — POTASSIUM BLD-SCNC: 3.7 MMOL/L (ref 3.5–5.1)

## 2022-08-30 PROCEDURE — 3600000015 HC SURGERY LEVEL 5 ADDTL 15MIN: Performed by: SURGERY

## 2022-08-30 PROCEDURE — 6370000000 HC RX 637 (ALT 250 FOR IP): Performed by: ANESTHESIOLOGY

## 2022-08-30 PROCEDURE — 6360000002 HC RX W HCPCS: Performed by: NURSE ANESTHETIST, CERTIFIED REGISTERED

## 2022-08-30 PROCEDURE — 2580000003 HC RX 258: Performed by: ANESTHESIOLOGY

## 2022-08-30 PROCEDURE — 6360000002 HC RX W HCPCS: Performed by: ANESTHESIOLOGY

## 2022-08-30 PROCEDURE — 6360000002 HC RX W HCPCS: Performed by: STUDENT IN AN ORGANIZED HEALTH CARE EDUCATION/TRAINING PROGRAM

## 2022-08-30 PROCEDURE — 2580000003 HC RX 258: Performed by: SURGERY

## 2022-08-30 PROCEDURE — 6360000002 HC RX W HCPCS: Performed by: SURGERY

## 2022-08-30 PROCEDURE — 3600000005 HC SURGERY LEVEL 5 BASE: Performed by: SURGERY

## 2022-08-30 PROCEDURE — 7100000001 HC PACU RECOVERY - ADDTL 15 MIN: Performed by: SURGERY

## 2022-08-30 PROCEDURE — 7100000010 HC PHASE II RECOVERY - FIRST 15 MIN: Performed by: SURGERY

## 2022-08-30 PROCEDURE — 2500000003 HC RX 250 WO HCPCS: Performed by: STUDENT IN AN ORGANIZED HEALTH CARE EDUCATION/TRAINING PROGRAM

## 2022-08-30 PROCEDURE — 7100000011 HC PHASE II RECOVERY - ADDTL 15 MIN: Performed by: SURGERY

## 2022-08-30 PROCEDURE — 2500000003 HC RX 250 WO HCPCS

## 2022-08-30 PROCEDURE — 2709999900 HC NON-CHARGEABLE SUPPLY: Performed by: SURGERY

## 2022-08-30 PROCEDURE — 3700000000 HC ANESTHESIA ATTENDED CARE: Performed by: SURGERY

## 2022-08-30 PROCEDURE — 2500000003 HC RX 250 WO HCPCS: Performed by: SURGERY

## 2022-08-30 PROCEDURE — 3700000001 HC ADD 15 MINUTES (ANESTHESIA): Performed by: SURGERY

## 2022-08-30 PROCEDURE — 7100000000 HC PACU RECOVERY - FIRST 15 MIN: Performed by: SURGERY

## 2022-08-30 PROCEDURE — 2500000003 HC RX 250 WO HCPCS: Performed by: NURSE ANESTHETIST, CERTIFIED REGISTERED

## 2022-08-30 PROCEDURE — 84132 ASSAY OF SERUM POTASSIUM: CPT

## 2022-08-30 RX ORDER — HYDROCODONE BITARTRATE AND ACETAMINOPHEN 5; 325 MG/1; MG/1
1 TABLET ORAL EVERY 4 HOURS PRN
Qty: 30 TABLET | Refills: 0 | Status: SHIPPED | OUTPATIENT
Start: 2022-08-30 | End: 2022-09-04

## 2022-08-30 RX ORDER — IPRATROPIUM BROMIDE AND ALBUTEROL SULFATE 2.5; .5 MG/3ML; MG/3ML
1 SOLUTION RESPIRATORY (INHALATION)
Status: DISCONTINUED | OUTPATIENT
Start: 2022-08-30 | End: 2022-08-30 | Stop reason: HOSPADM

## 2022-08-30 RX ORDER — PROPOFOL 10 MG/ML
INJECTION, EMULSION INTRAVENOUS PRN
Status: DISCONTINUED | OUTPATIENT
Start: 2022-08-30 | End: 2022-08-30 | Stop reason: SDUPTHER

## 2022-08-30 RX ORDER — METOPROLOL TARTRATE 5 MG/5ML
2.5 INJECTION INTRAVENOUS EVERY 6 HOURS
Status: DISCONTINUED | OUTPATIENT
Start: 2022-08-30 | End: 2022-08-30 | Stop reason: HOSPADM

## 2022-08-30 RX ORDER — EPHEDRINE SULFATE/0.9% NACL/PF 50 MG/5 ML
SYRINGE (ML) INTRAVENOUS PRN
Status: DISCONTINUED | OUTPATIENT
Start: 2022-08-30 | End: 2022-08-30 | Stop reason: SDUPTHER

## 2022-08-30 RX ORDER — ACETAMINOPHEN 500 MG
1000 TABLET ORAL ONCE
Status: COMPLETED | OUTPATIENT
Start: 2022-08-30 | End: 2022-08-30

## 2022-08-30 RX ORDER — HYDROMORPHONE HYDROCHLORIDE 2 MG/ML
0.5 INJECTION, SOLUTION INTRAMUSCULAR; INTRAVENOUS; SUBCUTANEOUS EVERY 5 MIN PRN
Status: COMPLETED | OUTPATIENT
Start: 2022-08-30 | End: 2022-08-30

## 2022-08-30 RX ORDER — OXYCODONE HYDROCHLORIDE 5 MG/1
5 TABLET ORAL
Status: COMPLETED | OUTPATIENT
Start: 2022-08-30 | End: 2022-08-30

## 2022-08-30 RX ORDER — 0.9 % SODIUM CHLORIDE 0.9 %
INTRAVENOUS SOLUTION INTRAVENOUS CONTINUOUS PRN
Status: COMPLETED | OUTPATIENT
Start: 2022-08-30 | End: 2022-08-30

## 2022-08-30 RX ORDER — ONDANSETRON 2 MG/ML
INJECTION INTRAMUSCULAR; INTRAVENOUS PRN
Status: DISCONTINUED | OUTPATIENT
Start: 2022-08-30 | End: 2022-08-30 | Stop reason: SDUPTHER

## 2022-08-30 RX ORDER — SODIUM CHLORIDE 0.9 % (FLUSH) 0.9 %
5-40 SYRINGE (ML) INJECTION EVERY 12 HOURS SCHEDULED
Status: DISCONTINUED | OUTPATIENT
Start: 2022-08-30 | End: 2022-08-30 | Stop reason: HOSPADM

## 2022-08-30 RX ORDER — SODIUM CHLORIDE, SODIUM LACTATE, POTASSIUM CHLORIDE, CALCIUM CHLORIDE 600; 310; 30; 20 MG/100ML; MG/100ML; MG/100ML; MG/100ML
INJECTION, SOLUTION INTRAVENOUS CONTINUOUS
Status: DISCONTINUED | OUTPATIENT
Start: 2022-08-30 | End: 2022-08-30 | Stop reason: HOSPADM

## 2022-08-30 RX ORDER — HALOPERIDOL 5 MG/ML
1 INJECTION INTRAMUSCULAR
Status: DISCONTINUED | OUTPATIENT
Start: 2022-08-30 | End: 2022-08-30 | Stop reason: HOSPADM

## 2022-08-30 RX ORDER — PROCHLORPERAZINE EDISYLATE 5 MG/ML
5 INJECTION INTRAMUSCULAR; INTRAVENOUS
Status: DISCONTINUED | OUTPATIENT
Start: 2022-08-30 | End: 2022-08-30 | Stop reason: HOSPADM

## 2022-08-30 RX ORDER — DEXAMETHASONE SODIUM PHOSPHATE 4 MG/ML
INJECTION, SOLUTION INTRA-ARTICULAR; INTRALESIONAL; INTRAMUSCULAR; INTRAVENOUS; SOFT TISSUE PRN
Status: DISCONTINUED | OUTPATIENT
Start: 2022-08-30 | End: 2022-08-30 | Stop reason: SDUPTHER

## 2022-08-30 RX ORDER — METOPROLOL TARTRATE 5 MG/5ML
INJECTION INTRAVENOUS
Status: COMPLETED
Start: 2022-08-30 | End: 2022-08-30

## 2022-08-30 RX ORDER — SODIUM CHLORIDE 9 MG/ML
INJECTION, SOLUTION INTRAVENOUS PRN
Status: DISCONTINUED | OUTPATIENT
Start: 2022-08-30 | End: 2022-08-30 | Stop reason: HOSPADM

## 2022-08-30 RX ORDER — MIDAZOLAM HYDROCHLORIDE 1 MG/ML
INJECTION INTRAMUSCULAR; INTRAVENOUS PRN
Status: DISCONTINUED | OUTPATIENT
Start: 2022-08-30 | End: 2022-08-30 | Stop reason: SDUPTHER

## 2022-08-30 RX ORDER — LIDOCAINE HYDROCHLORIDE 10 MG/ML
INJECTION, SOLUTION INFILTRATION; PERINEURAL PRN
Status: DISCONTINUED | OUTPATIENT
Start: 2022-08-30 | End: 2022-08-30 | Stop reason: ALTCHOICE

## 2022-08-30 RX ORDER — TRIAMCINOLONE ACETONIDE 40 MG/ML
INJECTION, SUSPENSION INTRA-ARTICULAR; INTRAMUSCULAR PRN
Status: DISCONTINUED | OUTPATIENT
Start: 2022-08-30 | End: 2022-08-30 | Stop reason: ALTCHOICE

## 2022-08-30 RX ORDER — LIDOCAINE HYDROCHLORIDE 10 MG/ML
1 INJECTION, SOLUTION INFILTRATION; PERINEURAL
Status: DISCONTINUED | OUTPATIENT
Start: 2022-08-30 | End: 2022-08-30 | Stop reason: HOSPADM

## 2022-08-30 RX ORDER — METOPROLOL TARTRATE 5 MG/5ML
5 INJECTION INTRAVENOUS EVERY 6 HOURS
Status: DISCONTINUED | OUTPATIENT
Start: 2022-08-30 | End: 2022-08-30

## 2022-08-30 RX ORDER — FENTANYL CITRATE 50 UG/ML
100 INJECTION, SOLUTION INTRAMUSCULAR; INTRAVENOUS
Status: DISCONTINUED | OUTPATIENT
Start: 2022-08-30 | End: 2022-08-30 | Stop reason: HOSPADM

## 2022-08-30 RX ORDER — SODIUM CHLORIDE 0.9 % (FLUSH) 0.9 %
5-40 SYRINGE (ML) INJECTION PRN
Status: DISCONTINUED | OUTPATIENT
Start: 2022-08-30 | End: 2022-08-30 | Stop reason: HOSPADM

## 2022-08-30 RX ORDER — LIDOCAINE HYDROCHLORIDE 20 MG/ML
INJECTION, SOLUTION EPIDURAL; INFILTRATION; INTRACAUDAL; PERINEURAL PRN
Status: DISCONTINUED | OUTPATIENT
Start: 2022-08-30 | End: 2022-08-30 | Stop reason: SDUPTHER

## 2022-08-30 RX ORDER — MIDAZOLAM HYDROCHLORIDE 2 MG/2ML
2 INJECTION, SOLUTION INTRAMUSCULAR; INTRAVENOUS
Status: COMPLETED | OUTPATIENT
Start: 2022-08-30 | End: 2022-08-30

## 2022-08-30 RX ORDER — EPINEPHRINE 1 MG/ML(1)
AMPUL (ML) INJECTION PRN
Status: DISCONTINUED | OUTPATIENT
Start: 2022-08-30 | End: 2022-08-30 | Stop reason: ALTCHOICE

## 2022-08-30 RX ADMIN — SODIUM CHLORIDE, SODIUM LACTATE, POTASSIUM CHLORIDE, AND CALCIUM CHLORIDE: 600; 310; 30; 20 INJECTION, SOLUTION INTRAVENOUS at 11:25

## 2022-08-30 RX ADMIN — FENTANYL CITRATE 50 MCG: 50 INJECTION INTRAMUSCULAR; INTRAVENOUS at 12:48

## 2022-08-30 RX ADMIN — LIDOCAINE HYDROCHLORIDE 100 MG: 20 INJECTION, SOLUTION EPIDURAL; INFILTRATION; INTRACAUDAL; PERINEURAL at 12:05

## 2022-08-30 RX ADMIN — HYDROMORPHONE HYDROCHLORIDE 0.5 MG: 2 INJECTION, SOLUTION INTRAMUSCULAR; INTRAVENOUS; SUBCUTANEOUS at 13:55

## 2022-08-30 RX ADMIN — FENTANYL CITRATE 50 MCG: 50 INJECTION INTRAMUSCULAR; INTRAVENOUS at 12:19

## 2022-08-30 RX ADMIN — Medication 10 MG: at 13:09

## 2022-08-30 RX ADMIN — HYDROMORPHONE HYDROCHLORIDE 0.5 MG: 2 INJECTION, SOLUTION INTRAMUSCULAR; INTRAVENOUS; SUBCUTANEOUS at 13:48

## 2022-08-30 RX ADMIN — HYDROMORPHONE HYDROCHLORIDE 0.5 MG: 2 INJECTION, SOLUTION INTRAMUSCULAR; INTRAVENOUS; SUBCUTANEOUS at 14:00

## 2022-08-30 RX ADMIN — PROPOFOL 300 MG: 10 INJECTION, EMULSION INTRAVENOUS at 12:05

## 2022-08-30 RX ADMIN — MIDAZOLAM 2 MG: 1 INJECTION INTRAMUSCULAR; INTRAVENOUS at 11:41

## 2022-08-30 RX ADMIN — MIDAZOLAM 2 MG: 1 INJECTION INTRAMUSCULAR; INTRAVENOUS at 11:58

## 2022-08-30 RX ADMIN — HYDROMORPHONE HYDROCHLORIDE 0.5 MG: 2 INJECTION, SOLUTION INTRAMUSCULAR; INTRAVENOUS; SUBCUTANEOUS at 14:13

## 2022-08-30 RX ADMIN — SODIUM CHLORIDE, SODIUM LACTATE, POTASSIUM CHLORIDE, AND CALCIUM CHLORIDE: 600; 310; 30; 20 INJECTION, SOLUTION INTRAVENOUS at 13:13

## 2022-08-30 RX ADMIN — METOPROLOL TARTRATE 2.5 MG: 5 INJECTION INTRAVENOUS at 15:00

## 2022-08-30 RX ADMIN — Medication 2 G: at 12:11

## 2022-08-30 RX ADMIN — PROPOFOL 50 MG: 10 INJECTION, EMULSION INTRAVENOUS at 12:48

## 2022-08-30 RX ADMIN — PROPOFOL 50 MG: 10 INJECTION, EMULSION INTRAVENOUS at 12:11

## 2022-08-30 RX ADMIN — ACETAMINOPHEN 1000 MG: 500 TABLET, FILM COATED ORAL at 11:24

## 2022-08-30 RX ADMIN — METOPROLOL TARTRATE 2.5 MG: 1 INJECTION, SOLUTION INTRAVENOUS at 15:00

## 2022-08-30 RX ADMIN — ONDANSETRON 4 MG: 2 INJECTION INTRAMUSCULAR; INTRAVENOUS at 12:31

## 2022-08-30 RX ADMIN — DEXAMETHASONE SODIUM PHOSPHATE 4 MG: 4 INJECTION, SOLUTION INTRAMUSCULAR; INTRAVENOUS at 12:30

## 2022-08-30 RX ADMIN — Medication 10 MG: at 12:56

## 2022-08-30 RX ADMIN — OXYCODONE 5 MG: 5 TABLET ORAL at 15:09

## 2022-08-30 ASSESSMENT — PAIN SCALES - GENERAL
PAINLEVEL_OUTOF10: 7
PAINLEVEL_OUTOF10: 7
PAINLEVEL_OUTOF10: 3
PAINLEVEL_OUTOF10: 7
PAINLEVEL_OUTOF10: 5
PAINLEVEL_OUTOF10: 7

## 2022-08-30 ASSESSMENT — PAIN DESCRIPTION - ORIENTATION
ORIENTATION: LEFT
ORIENTATION: LEFT

## 2022-08-30 ASSESSMENT — PAIN DESCRIPTION - LOCATION
LOCATION: BREAST
LOCATION: BREAST
LOCATION: CHEST

## 2022-08-30 ASSESSMENT — PAIN DESCRIPTION - DESCRIPTORS: DESCRIPTORS: THROBBING

## 2022-08-30 ASSESSMENT — LIFESTYLE VARIABLES: SMOKING_STATUS: 1

## 2022-08-30 ASSESSMENT — PAIN - FUNCTIONAL ASSESSMENT: PAIN_FUNCTIONAL_ASSESSMENT: 0-10

## 2022-08-30 NOTE — DISCHARGE INSTRUCTIONS
Keep dressings in place and dry until return to office. Avoid lifting arms above shoulder level. Avoid lifting more than 5 lbs or any strenuous activity       DIET  Clear liquids until no nausea or vomiting; then light diet for the first day. Advance to regular diet on second day, unless your doctor orders otherwise. If nausea and vomiting continues, call your doctor. MEDICATION INTERACTION:During your procedure you potentially received a medication or medications which may reduce the effectiveness of oral contraceptives. Please consider other forms of contraception for 1 month following your procedure if you are currently using oral contraceptives as your primary form of birth control. In addition to this, we recommend continuing your oral contraceptive as prescribed, unless otherwise instructed by your physician, during this time        After general anesthesia or intravenous sedation, for 24 hours or while taking prescription Narcotics:  Limit your activities  A responsible adult needs to be with you for the next 24 hours  Do not drive and operate hazardous machinery  Do not make important personal or business decisions  Do not drink alcoholic beverages  If you have not urinated within 8 hours after discharge, and you are experiencing discomfort from urinary retention, please go to the nearest ED. If you have sleep apnea and have a CPAP machine, please use it for all naps and sleeping. Please use caution when taking narcotics and any of your home medications that may cause drowsiness. *  Please give a list of your current medications to your Primary Care Provider. *  Please update this list whenever your medications are discontinued, doses are      changed, or new medications (including over-the-counter products) are added. *  Please carry medication information at all times in case of emergency situations.

## 2022-08-30 NOTE — ANESTHESIA PRE PROCEDURE
Department of Anesthesiology  Preprocedure Note       Name:  Elisabeth Garcia   Age:  39 y.o.  :  1985                                          MRN:  [id number]         Date:  2022      Surgeon: Evelyn Holley):  Víctor Dash MD    Procedure: Procedure(s):  BILATERAL REVISION OF BREAST REDUCTION    Medications prior to admission:   Prior to Admission medications    Medication Sig Start Date End Date Taking? Authorizing Provider   levothyroxine (UNITHROID) 25 MCG tablet Take 25 mcg by mouth at bedtime   Yes Historical Provider, MD   amLODIPine (NORVASC) 10 MG tablet Take 10 mg by mouth daily   Yes Historical Provider, MD   FUROSEMIDE PO Take by mouth daily   Yes Historical Provider, MD   Cholecalciferol (VITAMIN D3) 1.25 MG (30949 UT) CAPS Take by mouth daily   Yes Historical Provider, MD   Iron-Folic [id number] 150-1.25 MG TABS Take by mouth daily   Yes Historical Provider, MD   buPROPion (WELLBUTRIN SR) 150 MG extended release tablet Take 150 mg by mouth daily    Ar Automatic Reconciliation   Calcium Carb-Cholecalciferol 600-800 MG-UNIT CHEW Take by mouth    Ar Automatic Reconciliation   PARoxetine (PAXIL CR) 37.5 MG extended release tablet 50 mg Nightly.  17   Ar Automatic Reconciliation       Current medications:    Current Facility-Administered Medications   Medication Dose Route Frequency Provider Last Rate Last Admin    lidocaine 1 % injection 1 mL  1 mL IntraDERmal Once PRN Mauro Claros MD        acetaminophen (TYLENOL) tablet 1,000 mg  1,000 mg Oral Once Mauro Claros MD        fentaNYL (SUBLIMAZE) injection 100 mcg  100 mcg IntraVENous Once PRN Mauro Claros MD        lactated ringers infusion   IntraVENous Continuous Mauro Claros MD        sodium chloride flush 0.9 % injection 5-40 mL  5-40 mL IntraVENous 2 times per day Mauro Claros MD        sodium chloride flush 0.9 % injection 5-40 mL  5-40 mL IntraVENous PRN Mauro Claros MD        0.9 % sodium chloride infusion   IntraVENous PRN Lasha Lynne MD        midazolam PF (VERSED) injection 2 mg  2 mg IntraVENous Once PRN Lasha Lynne MD           Allergies: Allergies   Allergen Reactions    Doxycycline Rash and Shortness Of Breath    Bactrim [Sulfamethoxazole-Trimethoprim] Hives    Sulfa Antibiotics Hives       Problem List:    Patient Active Problem List   Diagnosis Code    Incontinence in female R32    Palpitations R00.2    Menstrual bleeding problem N93.9    Hashimoto's thyroiditis E06.3    Vaginal discharge N89.8    Encounter for immunization Z23    Anxiety F41.9    History of cervical dysplasia Z87.410    Physical exam Z00.00    Episode of recurrent major depressive disorder ([facility] [address] F33.9       Past Medical History:        Diagnosis Date    Anxiety and depression     Cervical cancer ([facility] [address]     Cervical dysplasia     Hashimoto's thyroiditis     Hypertension     Kidney stones     Medullary sponge kidney     Migraines     Multilevel degenerative disc disease        Past Surgical History:        Procedure Laterality Date    BREAST REDUCTION SURGERY Bilateral 11/10/2020    Jannette Em [facility] at [facility]    GYN      cervical cone biopsy    TUBAL LIGATION         Social History:    Social History     Tobacco Use    Smoking status: Every Day     Packs/day: 0.25     Types: Cigarettes    Smokeless tobacco: Never   Substance Use Topics    Alcohol use:  No                                Ready to quit: Not Answered  Counseling given: Not Answered      Vital Signs (Current):   Vitals:    08/25/22 1136 08/30/22 0957   BP:  (!) 141/85   Pulse:  95   Resp:  18   Temp:  98.4 °F (36.9 °C)   TempSrc:  Oral   SpO2:  98%   Weight: 175 lb (79.4 kg) 175 lb (79.4 kg)   Height: 5' 3\" (1.6 m)                                               BP Readings from Last 3 Encounters:   08/30/22 (!) 141/85   10/04/21 132/86   07/29/21 (!) 146/95       NPO Status: BMI:   Wt Readings from Last 3 Encounters:   08/30/22 175 lb (79.4 kg)   10/04/21 175 lb (79.4 kg)   07/29/21 171 lb 12.8 oz (77.9 kg)     Body mass index is 31 kg/m². CBC:   Lab Results   Component Value Date/Time    HGB 13.8 11/10/2020 05:52 AM       CMP: No results found for: NA, K, CL, CO2, BUN, CREATININE, GFRAA, AGRATIO, LABGLOM, GLUCOSE, GLU, PROT, CALCIUM, BILITOT, ALKPHOS, AST, ALT    POC Tests: No results for input(s): POCGLU, POCNA, POCK, POCCL, POCBUN, POCHEMO, POCHCT in the last 72 hours. Coags: No results found for: PROTIME, INR, APTT    HCG (If Applicable): No results found for: PREGTESTUR, PREGSERUM, HCG, HCGQUANT     ABGs: No results found for: PHART, PO2ART, SDL4KIZ, LTZ5LNV, BEART, K0HLAXSP     Type & Screen (If Applicable):  No results found for: LABABO, LABRH    Drug/Infectious Status (If Applicable):  No results found for: HIV, HEPCAB    COVID-19 Screening (If Applicable): No results found for: COVID19        Anesthesia Evaluation  Patient summary reviewed and Nursing notes reviewed no history of anesthetic complications:   Airway: Mallampati: I     Neck ROM: full  Mouth opening: > = 3 FB   Dental: normal exam         Pulmonary: breath sounds clear to auscultation  (+) current smoker                           Cardiovascular:  Exercise tolerance: good (>4 METS),   (+) hypertension:,         Rhythm: regular  Rate: normal                    Neuro/Psych:   (+) headaches:, depression/anxiety             GI/Hepatic/Renal: Neg GI/Hepatic/Renal ROS            Endo/Other:    (+) hypothyroidism::., .                 Abdominal:             Vascular: negative vascular ROS. Other Findings:           Anesthesia Plan      general     ASA 2       Induction: intravenous. Anesthetic plan and risks discussed with patient (and a friend).                         Bret Barnes MD   8/30/2022

## 2022-08-30 NOTE — ANESTHESIA POSTPROCEDURE EVALUATION
Department of Anesthesiology  Postprocedure Note    Patient: Jessica Gottlieb  MRN: [id number]  YOB: 1985  Date of evaluation: 8/30/2022      Procedure Summary     Date: 08/30/22 Room / Location: [facility] OP OR 01 / [facility]    Anesthesia Start: 1200 Anesthesia Stop: 1336    Procedure: BILATERAL REVISION OF BREAST REDUCTION (Bilateral: Breast) Diagnosis:       Scar, hypertrophic      (Scar, hypertrophic [L91.0])    Surgeons: Yogesh Landon MD Responsible Provider: Dana Da Silva MD    Anesthesia Type: General ASA Status: 2          Anesthesia Type: General    Kim Phase I: Kim Score: 9    Kim Phase II: Kim Score: 10      Anesthesia Post Evaluation    Patient location during evaluation: bedside  Patient participation: complete - patient participated  Level of consciousness: awake and alert  Pain score: 1  Airway patency: patent  Nausea & Vomiting: no vomiting  Complications: no  Cardiovascular status: hemodynamically stable  Respiratory status: acceptable  Hydration status: euvolemic

## 2022-08-30 NOTE — H&P
CC: Painful breast scares    HPI:  Patient with previous history of macromastia s/p breast reduction now with persistent pain at surgical incisions. Presents for revision. Past Medical History:   Diagnosis Date    Arthritis     DDD    Cervical cancer ([address]     pt denies- cervix dysplasia    Cervical cancer ([facility] [address]     Kidney stones     Neurological disorder     migraines    Other ill-defined conditions(799.89)     bulgig discs, scoliosis    Psychiatric disorder     depression and anxiety     Past Surgical History:   Procedure Laterality Date    HX GYN      cervical cone bx    HX TUBAL LIGATION       Visit Vitals  BP (!) 147/83 (BP 1 Location: Left arm, BP Patient Position: Supine)   Pulse 88   Temp 98.2 °F (36.8 °C)   Resp 18   Wt 72.6 kg (160 lb)   SpO2 100%   BMI 27.46 kg/m²     A&O x3  RRR  Resp clear  Bilateral breast excision marked. A/P:  Will proceed with bilateral revision. Risks, benefits and alternatives discussed. Consent affirmed.

## 2022-09-06 NOTE — OP NOTE
Operative Note      Patient: Meryl De Leon  YOB: 1985  MRN: [id number]    Date of Procedure: 8/30/2022    Pre-Op Diagnosis: Scar, hypertrophic [L91.0]    Post-Op Diagnosis: Same       Procedure(s):  BILATERAL REVISION OF BREAST REDUCTION    Surgeon(s):  Ab Ramos MD    Assistant:   * No surgical staff found *    Anesthesia: General    Estimated Blood Loss (mL): less than 50     Complications: None    Specimens:   * No specimens in log *    Implants:  * No implants in log *      Drains: * No LDAs found *    Detailed Description of Procedure:     Prior to the procedure the patient was met in holding. Once again a discussion of the risks, benefits and alternatives was conducted including but not limited to bleeding, infection, failure of the surgery, need for further procedures, disappointing or unacceptable cosmesis, damage to adjacent structures was conducted. The patient again affirmed understanding and consent. The patient was marked in the upright and relaxed position. Patient brought to the OR, surgical timeout performed and anesthesia induced. Patient positioned and prepped and draped. Attention turned to the right side. Previously marked areas of revision injected with 20 mls 1% lidocaine with epi. These were primarily laterally and centrally at the vertical. These were sharply excised. Hemostasis obtained with cautery and closure completed with deep dermal insorb and running stratafix. Incision injected with 40 mg/ml kenalog. Mirror procedure performed on the left side with addition of resection of an additional 50mls of volume to improve symmetry. Formal breast dressing applied.

## 2022-09-26 ENCOUNTER — OFFICE VISIT (OUTPATIENT)
Dept: GYNECOLOGY | Age: 37
End: 2022-09-26
Payer: COMMERCIAL

## 2022-09-26 VITALS — BODY MASS INDEX: 30.12 KG/M2 | WEIGHT: 170 LBS | HEIGHT: 63 IN

## 2022-09-26 DIAGNOSIS — N92.0 MENORRHAGIA WITH REGULAR CYCLE: Primary | ICD-10-CM

## 2022-09-26 LAB — HEMOGLOBIN, POC: 13.9 G/DL

## 2022-09-26 PROCEDURE — 99214 OFFICE O/P EST MOD 30 MIN: CPT | Performed by: OBSTETRICS & GYNECOLOGY

## 2022-09-26 PROCEDURE — 76830 TRANSVAGINAL US NON-OB: CPT | Performed by: OBSTETRICS & GYNECOLOGY

## 2022-09-26 PROCEDURE — 85018 HEMOGLOBIN: CPT | Performed by: OBSTETRICS & GYNECOLOGY

## 2022-09-26 RX ORDER — VALACYCLOVIR HYDROCHLORIDE 500 MG/1
TABLET, FILM COATED ORAL
COMMUNITY
Start: 2022-08-15 | End: 2022-11-02

## 2022-09-26 RX ORDER — FUROSEMIDE 20 MG/1
TABLET ORAL
COMMUNITY
Start: 2022-09-21

## 2022-09-26 RX ORDER — PROPRANOLOL HYDROCHLORIDE 10 MG/1
TABLET ORAL
COMMUNITY
Start: 2022-09-21

## 2022-09-26 RX ORDER — MEDROXYPROGESTERONE ACETATE 150 MG/ML
150 INJECTION, SUSPENSION INTRAMUSCULAR
Qty: 1 ML | Refills: 3 | Status: SHIPPED | OUTPATIENT
Start: 2022-09-26 | End: 2022-10-17

## 2022-09-26 NOTE — PROGRESS NOTES
HPI  Ryan Armstrong is a 39 y.o. female seen for AUB. She started bleeding approximately 3 weeks ago, and is now bleeding heavily. She is changing a large overnight pad 3-4 times a day and is now passing large clots. An ultrasound today revealed a possible endometrial polyp. .    This patient states that she had a similar episode last year and her menses returned to normal without flooding or clots. She also states that she has had good luck with Depo-Provera to control abnormal bleeding prior to the time she had a tubal ligation. She admits to smoking occasionally that over age 28 did not care to use birth control pills. We will plan a progesterone withdrawal bleed and that her return in 3 weeks to recheck an ultrasound to see if there is truly a polyp there. If she needs surgery and needs a D&C she may consider an ablation. Past Medical History, Past Surgical History, Family history, Social History, and Medications were all reviewed with the patient today and updated as necessary.      Current Outpatient Medications   Medication Sig    furosemide (LASIX) 20 MG tablet TAKE 1 TABLET BY MOUTH EVERY DAY FOR 90 DAYS    propranolol (INDERAL) 10 MG tablet TAKE 1 TABLET BY MOUTH TWICE A DAY AS NEEDED FOR ANXIETY FOR 90 DAYS    valACYclovir (VALTREX) 500 MG tablet PLEASE SEE ATTACHED FOR DETAILED DIRECTIONS    norethindrone (AYGESTIN) 5 MG tablet Take 1 tablet by mouth daily    medroxyPROGESTERone (DEPO-PROVERA) 150 MG/ML injection Inject 1 mL into the muscle every 3 months    levothyroxine (SYNTHROID) 25 MCG tablet Take 25 mcg by mouth at bedtime    amLODIPine (NORVASC) 10 MG tablet Take 10 mg by mouth daily    Cholecalciferol (VITAMIN D3) 1.25 MG (23247 UT) CAPS Take by mouth daily    Iron-Folic [id number] 150-1.25 MG TABS Take by mouth daily    buPROPion (WELLBUTRIN SR) 150 MG extended release tablet Take 150 mg by mouth daily    Calcium Carb-Cholecalciferol 600-800 MG-UNIT CHEW Take by mouth PARoxetine (PAXIL CR) 37.5 MG extended release tablet 50 mg Nightly. No current facility-administered medications for this visit. Allergies   Allergen Reactions    Doxycycline Rash and Shortness Of Breath    Bactrim [Sulfamethoxazole-Trimethoprim] Hives    Sulfa Antibiotics Hives     Past Medical History:   Diagnosis Date    Anxiety and depression     Cervical cancer (HCC)     Cervical dysplasia     Hashimoto's thyroiditis     Hypertension     Kidney stones     Medullary sponge kidney     Migraines     Multilevel degenerative disc disease      Past Surgical History:   Procedure Laterality Date    BREAST REDUCTION SURGERY Bilateral 11/10/2020    Margot Gama MD at [facility] Bilateral 2022    BILATERAL REVISION OF BREAST REDUCTION performed by Maximino Howell MD at [facility]    GYN      cervical cone biopsy    TUBAL LIGATION       Family History   Problem Relation Age of Onset    Coronary Art Dis Father     Stroke Maternal Grandfather     Hypertension Maternal Grandfather     Thyroid Disease Mother         Hashimoto's thyroiditis, hypothyroidism    Elevated Lipids Mother     Thyroid Cancer Neg Hx       Social History     Tobacco Use    Smoking status: Every Day     Packs/day: 0.25     Types: Cigarettes    Smokeless tobacco: Never   Substance Use Topics    Alcohol use: No       Social History     Substance and Sexual Activity   Sexual Activity Yes    Partners: Male    Birth control/protection: Surgical     OB History    Para Term  AB Living   2 0 0 0 0 0   SAB IAB Ectopic Molar Multiple Live Births   0 0 0 0 0 0       Health Maintenance    ROS:    Review of Systems  General: Not Present- Chills, Fever, Fatigue, Insomnia, Hot flashes/Night sweats, Weight gain  Skin: Not Present- Bruising, Change in Wart/Mole, Excessive Sweating, Itching, Nail Changes, New Lesions, Rash, Skin Color Changes and Ulcer.   HEENT: Not Present- Headache, Blurred Vision, Double Vision, Glaucoma,

## 2022-10-17 ENCOUNTER — OFFICE VISIT (OUTPATIENT)
Dept: GYNECOLOGY | Age: 37
End: 2022-10-17
Payer: COMMERCIAL

## 2022-10-17 VITALS
BODY MASS INDEX: 30.12 KG/M2 | HEIGHT: 63 IN | WEIGHT: 170 LBS | DIASTOLIC BLOOD PRESSURE: 82 MMHG | SYSTOLIC BLOOD PRESSURE: 122 MMHG

## 2022-10-17 DIAGNOSIS — N84.0 ENDOMETRIAL POLYP: Primary | ICD-10-CM

## 2022-10-17 DIAGNOSIS — Z87.42 HISTORY OF MENORRHAGIA: ICD-10-CM

## 2022-10-17 PROCEDURE — 99214 OFFICE O/P EST MOD 30 MIN: CPT | Performed by: OBSTETRICS & GYNECOLOGY

## 2022-10-17 PROCEDURE — 76830 TRANSVAGINAL US NON-OB: CPT | Performed by: OBSTETRICS & GYNECOLOGY

## 2022-10-17 NOTE — PROGRESS NOTES
ARISTEO Gandraa is a 39 y.o. female seen for follow up ultrasound for endometrial polyp. She was given RX for Depo-Provera but does not want to take the injection. She was seen previously for menometrorrhagia and ultrasound revealed a possible polyp versus a clot. Repeat ultrasound today without menses reveals that she does have endometrial polyp. This patient had a long history of heavy and prolonged menses. She is also status post a bilateral fallopian occlusion. We rediscussed hysteroscopy, D&C and NovaSure ablation and she will be scheduled for this procedure with removal of the polyp at the same time. Past Medical History, Past Surgical History, Family history, Social History, and Medications were all reviewed with the patient today and updated as necessary. Current Outpatient Medications   Medication Sig    furosemide (LASIX) 20 MG tablet TAKE 1 TABLET BY MOUTH EVERY DAY FOR 90 DAYS    propranolol (INDERAL) 10 MG tablet TAKE 1 TABLET BY MOUTH TWICE A DAY AS NEEDED FOR ANXIETY FOR 90 DAYS    valACYclovir (VALTREX) 500 MG tablet PLEASE SEE ATTACHED FOR DETAILED DIRECTIONS    levothyroxine (SYNTHROID) 25 MCG tablet Take 25 mcg by mouth at bedtime    amLODIPine (NORVASC) 10 MG tablet Take 10 mg by mouth daily    Cholecalciferol (VITAMIN D3) 1.25 MG (33376 UT) CAPS Take by mouth daily    Iron-Folic [id number] 150-1.25 MG TABS Take by mouth daily    buPROPion (WELLBUTRIN SR) 150 MG extended release tablet Take 150 mg by mouth daily    Calcium Carb-Cholecalciferol 600-800 MG-UNIT CHEW Take by mouth    PARoxetine (PAXIL CR) 37.5 MG extended release tablet 50 mg Nightly. medroxyPROGESTERone (DEPO-PROVERA) 150 MG/ML injection Inject 1 mL into the muscle every 3 months (Patient not taking: Reported on 10/17/2022)     No current facility-administered medications for this visit.      Allergies   Allergen Reactions    Doxycycline Rash and Shortness Of Breath    Bactrim [Sulfamethoxazole-Trimethoprim] Hives    Sulfa Antibiotics Hives     Past Medical History:   Diagnosis Date    Anxiety and depression     Cervical cancer ([facility] [address]     Cervical dysplasia     Hashimoto's thyroiditis     Hypertension     Kidney stones     Medullary sponge kidney     Migraines     Multilevel degenerative disc disease      Past Surgical History:   Procedure Laterality Date    BREAST REDUCTION SURGERY Bilateral 11/10/2020    Venkat Perez MD at [facility] Bilateral 2022    BILATERAL REVISION OF BREAST REDUCTION performed by Carrie Hdez MD at [facility]    GYN      cervical cone biopsy    TUBAL LIGATION       Family History   Problem Relation Age of Onset    Coronary Art Dis Father     Stroke Maternal Grandfather     Hypertension Maternal Grandfather     Thyroid Disease Mother         Hashimoto's thyroiditis, hypothyroidism    Elevated Lipids Mother     Thyroid Cancer Neg Hx       Social History     Tobacco Use    Smoking status: Every Day     Packs/day: 0.25     Types: Cigarettes    Smokeless tobacco: Never   Substance Use Topics    Alcohol use: No       Social History     Substance and Sexual Activity   Sexual Activity Yes    Partners: Male    Birth control/protection: Surgical     OB History    Para Term  AB Living   2 0 0 0 0 0   SAB IAB Ectopic Molar Multiple Live Births   0 0 0 0 0 0       Health Maintenance  Mammogram: none  Colonoscopy: none  Bone Density:none    ROS:    Review of Systems  General: Not Present- Chills, Fever, Fatigue, Insomnia, Hot flashes/Night sweats, Weight gain  Skin: Not Present- Bruising, Change in Wart/Mole, Excessive Sweating, Itching, Nail Changes, New Lesions, Rash, Skin Color Changes and Ulcer. HEENT: Not Present- Headache, Blurred Vision, Double Vision, Glaucoma, Visual Disturbances, Hearing Loss, Ringing in the Ears, Vertigo, Nose Bleed, Bleeding Gums, Hoarseness and Sore Throat.   Neck: Not Present- Neck Pain and Neck Swelling. Respiratory: Not Present- Cough, Difficulty Breathing and Difficulty Breathing on Exertion. Breast: Not Present- Breast Mass, Breast Pain, Breast Swelling, Nipple Discharge, Nipple Pain, Recent Breast Size Changes and Skin Changes. Cardiovascular: Not Present- Abnormal Blood Pressure, Chest Pain, Edema, Fainting / Blacking Out, Palpitations, Shortness of Breath and Swelling of Extremities. Gastrointestinal: Not Present- Abdominal Pain, Abdominal Swelling, Bloating, Change in Bowel Habits, Constipation, Diarrhea, Difficulty Swallowing, Gets full quickly at meals, Nausea, Rectal Bleeding and Vomiting. Female Genitourinary: Not Present- Dysmenorrhea, Dyspareunia, Decreased libido, Excessive Menstrual Bleeding, Menstrual Irregularities, Pelvic Pain, Urinary Complaints, Vaginal Discharge, Vaginal itching/burning, Vaginal odor  Musculoskeletal: Not Present- Joint Pain and Muscle Pain. Neurological: Not Present- Dizziness, Fainting, Headaches and Seizures. Psychiatric: Not Present- Anxiety, Depression, Mood changes and Panic Attacks. Endocrine: Not Present- Appetite Changes, Cold Intolerance, Excessive Thirst, Excessive Urination and Heat Intolerance. Hematology: Not Present- Abnormal Bleeding, Easy Bruising and Enlarged Lymph Nodes. PHYSICAL EXAM:    /82 (Position: Sitting)   Ht 5' 3\" (1.6 m)   Wt 170 lb (77.1 kg)   LMP 10/12/2022   BMI 30.11 kg/m²           Medical problems and test results were reviewed with the patient today. ASSESSMENT and PLAN    1. Endometrial polyp  -     US NON OB TRANSVAGINAL     Comment   [id number]----x/u endometrial polyp   HISTORY: follow up   COMPARISON: Ultrasound 9/26/22----5wks, question endometrial polyp, rt follicle that measured 0.8/3.2/6.3IW.   -------------------------------------------------------------------------------------------------------   Uterus appears normal   Endo = 4.9mm---3D done with probable polyp still noted.    Rt ovy appears normal Lt ovy appears normal   No free fluid seen   Date: 10/17/2022 Perf. Physician: Dr. Yeyo Hendrickson: Christiana Ramirez RDMS     Discussed above plan hysteroscopy, D&C with removal of polyp and NovaSure ablation    Time:  I spent  30 minutes in preparing to see patient (including chart review and preparation), obtaining and/or reviewing additional medical history, performing a physical exam and evaluation, documenting clinical information in the electronic health record, independently interpreting results, communicating results to patient, family or caregiver, and/or coordinating care. No follow-ups on file.        Kirt Kumar MA

## 2022-10-25 ENCOUNTER — TELEPHONE (OUTPATIENT)
Dept: GYNECOLOGY | Age: 37
End: 2022-10-25

## 2022-10-25 NOTE — TELEPHONE ENCOUNTER
Spoke with pt about scheduling surgery. She has decided that she does not want to do the endometrial ablation. She only wants to do the [facility]  to get rid of the endometrial polyp. Please advise.

## 2022-10-27 ENCOUNTER — PREP FOR PROCEDURE (OUTPATIENT)
Dept: GYNECOLOGY | Age: 37
End: 2022-10-27

## 2022-10-27 PROBLEM — N92.0 MENORRHAGIA WITH REGULAR CYCLE: Status: ACTIVE | Noted: 2022-10-27

## 2022-10-31 ENCOUNTER — OFFICE VISIT (OUTPATIENT)
Dept: GYNECOLOGY | Age: 37
End: 2022-10-31

## 2022-10-31 ENCOUNTER — PREP FOR PROCEDURE (OUTPATIENT)
Dept: GYNECOLOGY | Age: 37
End: 2022-10-31

## 2022-10-31 VITALS
BODY MASS INDEX: 30.83 KG/M2 | HEIGHT: 63 IN | DIASTOLIC BLOOD PRESSURE: 86 MMHG | SYSTOLIC BLOOD PRESSURE: 122 MMHG | WEIGHT: 174 LBS

## 2022-10-31 DIAGNOSIS — N84.0 ENDOMETRIAL POLYP: ICD-10-CM

## 2022-10-31 DIAGNOSIS — Z01.818 PREOP EXAMINATION: Primary | ICD-10-CM

## 2022-10-31 DIAGNOSIS — N92.0 MENORRHAGIA WITH REGULAR CYCLE: ICD-10-CM

## 2022-10-31 RX ORDER — CLINDAMYCIN HYDROCHLORIDE 300 MG/1
300 CAPSULE ORAL 3 TIMES DAILY
COMMUNITY
Start: 2022-10-27

## 2022-10-31 RX ORDER — SODIUM CHLORIDE 0.9 % (FLUSH) 0.9 %
5-40 SYRINGE (ML) INJECTION EVERY 12 HOURS SCHEDULED
Status: CANCELLED | OUTPATIENT
Start: 2022-10-31

## 2022-10-31 RX ORDER — SODIUM CHLORIDE 9 MG/ML
INJECTION, SOLUTION INTRAVENOUS PRN
Status: CANCELLED | OUTPATIENT
Start: 2022-10-31

## 2022-10-31 RX ORDER — SODIUM CHLORIDE 0.9 % (FLUSH) 0.9 %
5-40 SYRINGE (ML) INJECTION PRN
Status: CANCELLED | OUTPATIENT
Start: 2022-10-31

## 2022-10-31 NOTE — PROGRESS NOTES
ARISTEO Bee is a 39 y.o. female seen for pre-op for DILATATION AND CURETTAGE HYSTEROSCOPY with Steva Gilford, MD on 11/3/2022      Pt currently on Clindamycin after having bilateral  revision breast reduction surgery    Past Medical History, Past Surgical History, Family history, Social History, and Medications were all reviewed with the patient today and updated as necessary. Current Outpatient Medications   Medication Sig    furosemide (LASIX) 20 MG tablet TAKE 1 TABLET BY MOUTH EVERY DAY FOR 90 DAYS    propranolol (INDERAL) 10 MG tablet TAKE 1 TABLET BY MOUTH TWICE A DAY AS NEEDED FOR ANXIETY FOR 90 DAYS    valACYclovir (VALTREX) 500 MG tablet PLEASE SEE ATTACHED FOR DETAILED DIRECTIONS    levothyroxine (SYNTHROID) 25 MCG tablet Take 25 mcg by mouth at bedtime    amLODIPine (NORVASC) 10 MG tablet Take 10 mg by mouth daily    Cholecalciferol (VITAMIN D3) 1.25 MG (14932 UT) CAPS Take by mouth daily    Iron-Folic [id number] 150-1.25 MG TABS Take by mouth daily    buPROPion (WELLBUTRIN SR) 150 MG extended release tablet Take 150 mg by mouth daily    Calcium Carb-Cholecalciferol 600-800 MG-UNIT CHEW Take by mouth    PARoxetine (PAXIL CR) 37.5 MG extended release tablet 50 mg Nightly. No current facility-administered medications for this visit.      Allergies   Allergen Reactions    Doxycycline Rash and Shortness Of Breath    Bactrim [Sulfamethoxazole-Trimethoprim] Hives    Sulfa Antibiotics Hives     Past Medical History:   Diagnosis Date    Anxiety and depression     Cervical cancer ([facility] [address]     Cervical dysplasia     Hashimoto's thyroiditis     Hypertension     Kidney stones     Medullary sponge kidney     Migraines     Multilevel degenerative disc disease      Past Surgical History:   Procedure Laterality Date    BREAST REDUCTION SURGERY Bilateral 11/10/2020    Luis Manuel Peña MD at [facility] Bilateral 8/30/2022    BILATERAL REVISION OF BREAST REDUCTION performed by Marilin Reza MD at [facility]    GYN      cervical cone biopsy    TUBAL LIGATION       Family History   Problem Relation Age of Onset    Coronary Art Dis Father     Stroke Maternal Grandfather     Hypertension Maternal Grandfather     Thyroid Disease Mother         Hashimoto's thyroiditis, hypothyroidism    Elevated Lipids Mother     Thyroid Cancer Neg Hx       Social History     Tobacco Use    Smoking status: Every Day     Packs/day: 0.25     Types: Cigarettes    Smokeless tobacco: Never   Substance Use Topics    Alcohol use: No       Social History     Substance and Sexual Activity   Sexual Activity Yes    Partners: Male    Birth control/protection: Surgical     OB History    Para Term  AB Living   2 0 0 0 0 0   SAB IAB Ectopic Molar Multiple Live Births   0 0 0 0 0 0         ROS:    Review of Systems  General: Not Present- Chills, Fever, Fatigue, Insomnia, Hot flashes/Night sweats, Weight gain  Skin: Not Present- Bruising, Change in Wart/Mole, Excessive Sweating, Itching, Nail Changes, New Lesions, Rash, Skin Color Changes and Ulcer. HEENT: Not Present- Headache, Blurred Vision, Double Vision, Glaucoma, Visual Disturbances, Hearing Loss, Ringing in the Ears, Vertigo, Nose Bleed, Bleeding Gums, Hoarseness and Sore Throat. Neck: Not Present- Neck Pain and Neck Swelling. Respiratory: Not Present- Cough, Difficulty Breathing and Difficulty Breathing on Exertion. Breast: Not Present- Breast Mass, Breast Pain, Breast Swelling, Nipple Discharge, Nipple Pain, Recent Breast Size Changes and Skin Changes. Cardiovascular: Not Present- Abnormal Blood Pressure, Chest Pain, Edema, Fainting / Blacking Out, Palpitations, Shortness of Breath and Swelling of Extremities. Gastrointestinal: Not Present- Abdominal Pain, Abdominal Swelling, Bloating, Change in Bowel Habits, Constipation, Diarrhea, Difficulty Swallowing, Gets full quickly at meals, Nausea, Rectal Bleeding and Vomiting.   Female Genitourinary: Not Present- Dysmenorrhea, Dyspareunia, Decreased libido, Excessive Menstrual Bleeding, Menstrual Irregularities, Pelvic Pain, Urinary Complaints, Vaginal Discharge, Vaginal itching/burning, Vaginal odor  Musculoskeletal: Not Present- Joint Pain and Muscle Pain. Neurological: Not Present- Dizziness, Fainting, Headaches and Seizures. Psychiatric: Not Present- Anxiety, Depression, Mood changes and Panic Attacks. Endocrine: Not Present- Appetite Changes, Cold Intolerance, Excessive Thirst, Excessive Urination and Heat Intolerance. Hematology: Not Present- Abnormal Bleeding, Easy Bruising and Enlarged Lymph Nodes. PHYSICAL EXAM:    LMP 10/12/2022           Medical problems and test results were reviewed with the patient today. ASSESSMENT and PLAN    1. Preop examination  2. Endometrial polyp  3. Menorrhagia with regular cycle                   No follow-ups on file.        Argenis Scruggs LPN

## 2022-10-31 NOTE — H&P
No chief complaint on file. This patient has been experiencing menometrorrhagia and was found to have an endometrial polyp. She has been scheduled for hysteroscopy with D&C removal of the polyp. She does not want an endometrial ablation. No flowsheet data found. There were no vitals filed for this visit. Physical Examination:  Chest - clear to auscultation, no wheezes, rales or rhonchi, symmetric air entry  Heart - normal rate, regular rhythm, normal S1, S2, no murmurs, rubs, clicks or gallops  Pelvic -deferred    ROS is negative except as described in chief complaint.   Past Surgical History:   Procedure Laterality Date    BREAST REDUCTION SURGERY Bilateral 11/10/2020    Wayne Peña MD at [facility] Bilateral 8/30/2022    BILATERAL REVISION OF BREAST REDUCTION performed by Pleasant Essex, MD at [facility]      cervical cone biopsy    TUBAL LIGATION       Current Outpatient Medications   Medication Sig Dispense Refill    clindamycin (CLEOCIN) 300 MG capsule TAKE 1 CAPSULE BY MOUTH EVERY 8 HOURS      furosemide (LASIX) 20 MG tablet TAKE 1 TABLET BY MOUTH EVERY DAY FOR 90 DAYS      propranolol (INDERAL) 10 MG tablet TAKE 1 TABLET BY MOUTH TWICE A DAY AS NEEDED FOR ANXIETY FOR 90 DAYS      valACYclovir (VALTREX) 500 MG tablet PLEASE SEE ATTACHED FOR DETAILED DIRECTIONS (Patient not taking: Reported on 10/31/2022)      levothyroxine (SYNTHROID) 25 MCG tablet Take 25 mcg by mouth at bedtime      amLODIPine (NORVASC) 10 MG tablet Take 10 mg by mouth daily      Cholecalciferol (VITAMIN D3) 1.25 MG (61835 UT) CAPS Take by mouth daily (Patient not taking: Reported on 30/19/5595)      Iron-Folic [id number] 150-1.25 MG TABS Take by mouth daily (Patient not taking: Reported on 10/31/2022)      buPROPion (WELLBUTRIN SR) 150 MG extended release tablet Take 150 mg by mouth daily      Calcium Carb-Cholecalciferol 600-800 MG-UNIT CHEW Take by mouth (Patient not taking: Reported on 10/31/2022)      PARoxetine (PAXIL CR) 37.5 MG extended release tablet 50 mg Nightly. No current facility-administered medications for this visit.       Allergies   Allergen Reactions    Doxycycline Rash and Shortness Of Breath    Bactrim [Sulfamethoxazole-Trimethoprim] Hives    Sulfa Antibiotics Hives     Past Medical History:   Diagnosis Date    Anxiety and depression     Cervical dysplasia     Pre-Cancerous cells    Hashimoto's thyroiditis     Hypertension     Kidney stones     Medullary sponge kidney     Migraines     Multilevel degenerative disc disease      Past Surgical History:   Procedure Laterality Date    BREAST REDUCTION SURGERY Bilateral 11/10/2020    Comfort Peña MD at [facility] Bilateral 8/30/2022    BILATERAL REVISION OF BREAST REDUCTION performed by Keegan Harden MD at [facility]    GYN      cervical cone biopsy    TUBAL LIGATION       Family History   Problem Relation Age of Onset    Coronary Art Dis Father     Stroke Maternal Grandfather     Hypertension Maternal Grandfather     Thyroid Disease Mother         Hashimoto's thyroiditis, hypothyroidism    Elevated Lipids Mother     Thyroid Cancer Neg Hx       Patient Active Problem List   Diagnosis    Incontinence in female    Palpitations    Menstrual bleeding problem    Hashimoto's thyroiditis    Vaginal discharge    Encounter for immunization    Anxiety    History of cervical dysplasia    Physical exam    Episode of recurrent major depressive disorder ([address]    History of menorrhagia    Endometrial polyp    Menorrhagia with regular cycle       Plan and instructions      Hysteroscopy with D&C and removal of endometrial polyp

## 2022-11-02 NOTE — PERIOP NOTE
Patient verified name and . Order for consent found in EHR and matches case posting; patient verifies procedure. Type 1B surgery, phone assessment complete. Orders received. Labs per surgeon: none. Labs per anesthesia protocol: K+-patient faxing over most recent blood work from [facility]. Patient answered medical/surgical history questions at their best of ability. All prior to admission medications documented in [facility]. Patient instructed to take the following medications the day of surgery according to anesthesia guidelines with a small sip of water: amlodipine, wellbutrin, propranolol if needed, clindamycin. On the day before surgery please take Acetaminophen 1000mg in the morning and then again before bed. You may substitute for Tylenol 650 mg. Hold all vitamins 7 days prior to surgery and NSAIDS 5 days prior to surgery. Prescription meds to hold: none. Patient instructed on the following:    > Arrive at A Entrance, time of arrival to be called the day before by 1700  > NPO after midnight, unless otherwise indicated, including gum, mints, and ice chips  > Responsible adult must drive patient to the hospital, stay during surgery, and patient will need supervision 24 hours after anesthesia  > Use dial antibacterial soap in shower the night before surgery and on the morning of surgery  > All piercings must be removed prior to arrival.    > Leave all valuables (money and jewelry) at home but bring insurance card and ID on DOS.   > You may be required to pay a deductible or co-pay on the day of your procedure. You can pre-pay by calling [phone] if your surgery is at the [facility] or [phone] if your surgery is at the [facility]. > Do not wear make-up, nail polish, lotions, cologne, perfumes, powders, or oil on skin. Artificial nails are not permitted.

## 2022-11-03 ENCOUNTER — ANESTHESIA EVENT (OUTPATIENT)
Dept: SURGERY | Age: 37
End: 2022-11-03
Payer: COMMERCIAL

## 2022-11-03 ENCOUNTER — ANESTHESIA (OUTPATIENT)
Dept: SURGERY | Age: 37
End: 2022-11-03
Payer: COMMERCIAL

## 2022-11-03 ENCOUNTER — HOSPITAL ENCOUNTER (OUTPATIENT)
Age: 37
Setting detail: OUTPATIENT SURGERY
Discharge: HOME | End: 2022-11-03
Attending: OBSTETRICS & GYNECOLOGY | Admitting: OBSTETRICS & GYNECOLOGY
Payer: COMMERCIAL

## 2022-11-03 VITALS
HEIGHT: 63 IN | OXYGEN SATURATION: 95 % | SYSTOLIC BLOOD PRESSURE: 129 MMHG | DIASTOLIC BLOOD PRESSURE: 78 MMHG | TEMPERATURE: 98 F | WEIGHT: 178 LBS | RESPIRATION RATE: 16 BRPM | BODY MASS INDEX: 31.54 KG/M2 | HEART RATE: 90 BPM

## 2022-11-03 DIAGNOSIS — G89.18 POST-OP PAIN: Primary | ICD-10-CM

## 2022-11-03 DIAGNOSIS — N92.0 MENORRHAGIA WITH REGULAR CYCLE: ICD-10-CM

## 2022-11-03 PROBLEM — N92.1 MENOMETRORRHAGIA: Status: ACTIVE | Noted: 2022-10-27

## 2022-11-03 LAB
HCG UR QL: NEGATIVE
POTASSIUM BLD-SCNC: 3.9 MMOL/L (ref 3.5–5.1)

## 2022-11-03 PROCEDURE — 7100000010 HC PHASE II RECOVERY - FIRST 15 MIN: Performed by: OBSTETRICS & GYNECOLOGY

## 2022-11-03 PROCEDURE — 2500000003 HC RX 250 WO HCPCS: Performed by: NURSE ANESTHETIST, CERTIFIED REGISTERED

## 2022-11-03 PROCEDURE — 6370000000 HC RX 637 (ALT 250 FOR IP): Performed by: ANESTHESIOLOGY

## 2022-11-03 PROCEDURE — 6360000002 HC RX W HCPCS: Performed by: ANESTHESIOLOGY

## 2022-11-03 PROCEDURE — 58558 HYSTEROSCOPY BIOPSY: CPT | Performed by: OBSTETRICS & GYNECOLOGY

## 2022-11-03 PROCEDURE — 88305 TISSUE EXAM BY PATHOLOGIST: CPT

## 2022-11-03 PROCEDURE — 7100000001 HC PACU RECOVERY - ADDTL 15 MIN: Performed by: OBSTETRICS & GYNECOLOGY

## 2022-11-03 PROCEDURE — 2709999900 HC NON-CHARGEABLE SUPPLY: Performed by: OBSTETRICS & GYNECOLOGY

## 2022-11-03 PROCEDURE — 2580000003 HC RX 258: Performed by: ANESTHESIOLOGY

## 2022-11-03 PROCEDURE — 3600000013 HC SURGERY LEVEL 3 ADDTL 15MIN: Performed by: OBSTETRICS & GYNECOLOGY

## 2022-11-03 PROCEDURE — 3600000003 HC SURGERY LEVEL 3 BASE: Performed by: OBSTETRICS & GYNECOLOGY

## 2022-11-03 PROCEDURE — 7100000011 HC PHASE II RECOVERY - ADDTL 15 MIN: Performed by: OBSTETRICS & GYNECOLOGY

## 2022-11-03 PROCEDURE — 84132 ASSAY OF SERUM POTASSIUM: CPT

## 2022-11-03 PROCEDURE — 6360000002 HC RX W HCPCS: Performed by: NURSE ANESTHETIST, CERTIFIED REGISTERED

## 2022-11-03 PROCEDURE — 7100000000 HC PACU RECOVERY - FIRST 15 MIN: Performed by: OBSTETRICS & GYNECOLOGY

## 2022-11-03 PROCEDURE — 81025 URINE PREGNANCY TEST: CPT

## 2022-11-03 PROCEDURE — 3700000000 HC ANESTHESIA ATTENDED CARE: Performed by: OBSTETRICS & GYNECOLOGY

## 2022-11-03 PROCEDURE — 3700000001 HC ADD 15 MINUTES (ANESTHESIA): Performed by: OBSTETRICS & GYNECOLOGY

## 2022-11-03 RX ORDER — OXYCODONE HYDROCHLORIDE 5 MG/1
10 TABLET ORAL PRN
Status: COMPLETED | OUTPATIENT
Start: 2022-11-03 | End: 2022-11-03

## 2022-11-03 RX ORDER — FENTANYL CITRATE 50 UG/ML
INJECTION, SOLUTION INTRAMUSCULAR; INTRAVENOUS PRN
Status: DISCONTINUED | OUTPATIENT
Start: 2022-11-03 | End: 2022-11-03 | Stop reason: SDUPTHER

## 2022-11-03 RX ORDER — SODIUM CHLORIDE 0.9 % (FLUSH) 0.9 %
5-40 SYRINGE (ML) INJECTION PRN
Status: DISCONTINUED | OUTPATIENT
Start: 2022-11-03 | End: 2022-11-03 | Stop reason: HOSPADM

## 2022-11-03 RX ORDER — SODIUM CHLORIDE 9 MG/ML
INJECTION, SOLUTION INTRAVENOUS PRN
Status: DISCONTINUED | OUTPATIENT
Start: 2022-11-03 | End: 2022-11-03 | Stop reason: HOSPADM

## 2022-11-03 RX ORDER — SODIUM CHLORIDE, SODIUM LACTATE, POTASSIUM CHLORIDE, CALCIUM CHLORIDE 600; 310; 30; 20 MG/100ML; MG/100ML; MG/100ML; MG/100ML
100 INJECTION, SOLUTION INTRAVENOUS CONTINUOUS
Status: DISCONTINUED | OUTPATIENT
Start: 2022-11-03 | End: 2022-11-03 | Stop reason: HOSPADM

## 2022-11-03 RX ORDER — SODIUM CHLORIDE 0.9 % (FLUSH) 0.9 %
5-40 SYRINGE (ML) INJECTION PRN
Status: DISCONTINUED | OUTPATIENT
Start: 2022-11-03 | End: 2022-11-03

## 2022-11-03 RX ORDER — SODIUM CHLORIDE 9 MG/ML
INJECTION, SOLUTION INTRAVENOUS PRN
Status: DISCONTINUED | OUTPATIENT
Start: 2022-11-03 | End: 2022-11-03

## 2022-11-03 RX ORDER — OXYCODONE HYDROCHLORIDE 5 MG/1
5 TABLET ORAL PRN
Status: COMPLETED | OUTPATIENT
Start: 2022-11-03 | End: 2022-11-03

## 2022-11-03 RX ORDER — DEXAMETHASONE SODIUM PHOSPHATE 10 MG/ML
INJECTION INTRAMUSCULAR; INTRAVENOUS PRN
Status: DISCONTINUED | OUTPATIENT
Start: 2022-11-03 | End: 2022-11-03 | Stop reason: SDUPTHER

## 2022-11-03 RX ORDER — SODIUM CHLORIDE 0.9 % (FLUSH) 0.9 %
5-40 SYRINGE (ML) INJECTION EVERY 12 HOURS SCHEDULED
Status: DISCONTINUED | OUTPATIENT
Start: 2022-11-03 | End: 2022-11-03

## 2022-11-03 RX ORDER — ONDANSETRON 2 MG/ML
INJECTION INTRAMUSCULAR; INTRAVENOUS PRN
Status: DISCONTINUED | OUTPATIENT
Start: 2022-11-03 | End: 2022-11-03 | Stop reason: SDUPTHER

## 2022-11-03 RX ORDER — FENTANYL CITRATE 50 UG/ML
100 INJECTION, SOLUTION INTRAMUSCULAR; INTRAVENOUS PRN
Status: DISCONTINUED | OUTPATIENT
Start: 2022-11-03 | End: 2022-11-03 | Stop reason: HOSPADM

## 2022-11-03 RX ORDER — SODIUM CHLORIDE 0.9 % (FLUSH) 0.9 %
5-40 SYRINGE (ML) INJECTION EVERY 12 HOURS SCHEDULED
Status: DISCONTINUED | OUTPATIENT
Start: 2022-11-03 | End: 2022-11-03 | Stop reason: HOSPADM

## 2022-11-03 RX ORDER — HYDROCODONE BITARTRATE AND ACETAMINOPHEN 5; 325 MG/1; MG/1
1 TABLET ORAL EVERY 4 HOURS PRN
Qty: 18 TABLET | Refills: 0 | Status: SHIPPED | OUTPATIENT
Start: 2022-11-03 | End: 2022-11-06

## 2022-11-03 RX ORDER — PROPOFOL 10 MG/ML
INJECTION, EMULSION INTRAVENOUS PRN
Status: DISCONTINUED | OUTPATIENT
Start: 2022-11-03 | End: 2022-11-03 | Stop reason: SDUPTHER

## 2022-11-03 RX ORDER — LIDOCAINE HYDROCHLORIDE 10 MG/ML
1 INJECTION, SOLUTION INFILTRATION; PERINEURAL
Status: DISCONTINUED | OUTPATIENT
Start: 2022-11-03 | End: 2022-11-03 | Stop reason: HOSPADM

## 2022-11-03 RX ORDER — ONDANSETRON 2 MG/ML
4 INJECTION INTRAMUSCULAR; INTRAVENOUS
Status: DISCONTINUED | OUTPATIENT
Start: 2022-11-03 | End: 2022-11-03 | Stop reason: HOSPADM

## 2022-11-03 RX ORDER — LIDOCAINE HYDROCHLORIDE 20 MG/ML
INJECTION, SOLUTION EPIDURAL; INFILTRATION; INTRACAUDAL; PERINEURAL PRN
Status: DISCONTINUED | OUTPATIENT
Start: 2022-11-03 | End: 2022-11-03 | Stop reason: SDUPTHER

## 2022-11-03 RX ORDER — FENTANYL CITRATE 50 UG/ML
50 INJECTION, SOLUTION INTRAMUSCULAR; INTRAVENOUS PRN
Status: DISCONTINUED | OUTPATIENT
Start: 2022-11-03 | End: 2022-11-03 | Stop reason: HOSPADM

## 2022-11-03 RX ORDER — SODIUM CHLORIDE, SODIUM LACTATE, POTASSIUM CHLORIDE, CALCIUM CHLORIDE 600; 310; 30; 20 MG/100ML; MG/100ML; MG/100ML; MG/100ML
INJECTION, SOLUTION INTRAVENOUS CONTINUOUS
Status: DISCONTINUED | OUTPATIENT
Start: 2022-11-03 | End: 2022-11-03 | Stop reason: HOSPADM

## 2022-11-03 RX ORDER — MIDAZOLAM HYDROCHLORIDE 2 MG/2ML
2 INJECTION, SOLUTION INTRAMUSCULAR; INTRAVENOUS
Status: COMPLETED | OUTPATIENT
Start: 2022-11-03 | End: 2022-11-03

## 2022-11-03 RX ORDER — HYDROMORPHONE HYDROCHLORIDE 1 MG/ML
0.5 INJECTION, SOLUTION INTRAMUSCULAR; INTRAVENOUS; SUBCUTANEOUS EVERY 5 MIN PRN
Status: DISCONTINUED | OUTPATIENT
Start: 2022-11-03 | End: 2022-11-03 | Stop reason: HOSPADM

## 2022-11-03 RX ORDER — MIDAZOLAM HYDROCHLORIDE 1 MG/ML
INJECTION INTRAMUSCULAR; INTRAVENOUS PRN
Status: DISCONTINUED | OUTPATIENT
Start: 2022-11-03 | End: 2022-11-03 | Stop reason: SDUPTHER

## 2022-11-03 RX ADMIN — FENTANYL CITRATE 50 MCG: 50 INJECTION, SOLUTION INTRAMUSCULAR; INTRAVENOUS at 11:47

## 2022-11-03 RX ADMIN — MIDAZOLAM 2 MG: 1 INJECTION INTRAMUSCULAR; INTRAVENOUS at 11:42

## 2022-11-03 RX ADMIN — OXYCODONE 10 MG: 5 TABLET ORAL at 12:40

## 2022-11-03 RX ADMIN — FENTANYL CITRATE 50 MCG: 50 INJECTION, SOLUTION INTRAMUSCULAR; INTRAVENOUS at 12:04

## 2022-11-03 RX ADMIN — MIDAZOLAM 2 MG: 1 INJECTION INTRAMUSCULAR; INTRAVENOUS at 10:38

## 2022-11-03 RX ADMIN — DEXAMETHASONE SODIUM PHOSPHATE 8 MG: 10 INJECTION INTRAMUSCULAR; INTRAVENOUS at 12:04

## 2022-11-03 RX ADMIN — ONDANSETRON 4 MG: 2 INJECTION INTRAMUSCULAR; INTRAVENOUS at 12:04

## 2022-11-03 RX ADMIN — LIDOCAINE HYDROCHLORIDE 100 MG: 20 INJECTION, SOLUTION EPIDURAL; INFILTRATION; INTRACAUDAL; PERINEURAL at 11:47

## 2022-11-03 RX ADMIN — PROPOFOL 200 MG: 10 INJECTION, EMULSION INTRAVENOUS at 11:47

## 2022-11-03 RX ADMIN — SODIUM CHLORIDE, POTASSIUM CHLORIDE, SODIUM LACTATE AND CALCIUM CHLORIDE 100 ML/HR: 600; 310; 30; 20 INJECTION, SOLUTION INTRAVENOUS at 10:37

## 2022-11-03 ASSESSMENT — PAIN DESCRIPTION - LOCATION: LOCATION: ABDOMEN

## 2022-11-03 ASSESSMENT — PAIN SCALES - GENERAL: PAINLEVEL_OUTOF10: 5

## 2022-11-03 ASSESSMENT — PAIN - FUNCTIONAL ASSESSMENT: PAIN_FUNCTIONAL_ASSESSMENT: 0-10

## 2022-11-03 ASSESSMENT — LIFESTYLE VARIABLES: SMOKING_STATUS: 1

## 2022-11-03 NOTE — ANESTHESIA POSTPROCEDURE EVALUATION
Department of Anesthesiology  Postprocedure Note    Patient: Rosalind Poe  MRN: [id number]  YOB: 1985  Date of evaluation: 11/3/2022      Procedure Summary     Date: 11/03/22 Room / Location: [facility] 04 / [facility]    Anesthesia Start: 9950 Anesthesia Stop: 5288    Procedure: DILATATION AND CURETTAGE HYSTEROSCOPY (Vagina ) Diagnosis:       Menorrhagia with regular cycle      Endometrial polyp      (Menorrhagia with regular cycle [N92.0])      (Endometrial polyp [N84.0])    Surgeons: Dago Cruz MD Responsible Provider: Lisa Vences MD    Anesthesia Type: General ASA Status: 2          Anesthesia Type: General    Kim Phase I: Kim Score: 4    Kim Phase II:        Anesthesia Post Evaluation    Patient location during evaluation: PACU  Patient participation: complete - patient participated  Level of consciousness: awake  Pain score: 0  Airway patency: patent  Nausea & Vomiting: no nausea and no vomiting  Complications: no  Cardiovascular status: blood pressure returned to baseline and hemodynamically stable  Respiratory status: acceptable, spontaneous ventilation and nonlabored ventilation  Hydration status: euvolemic  Multimodal analgesia pain management approach

## 2022-11-03 NOTE — DISCHARGE INSTRUCTIONS
Hysteroscopy With Dilation and Curettage: What to Expect at [facility]     For a hysteroscopy, your doctor guides a lighted tube through your cervix and into your uterus. This helps the doctor see inside your uterus. For a dilation and curettage (D&C), your doctor uses a curved tool, called a curette, to gently scrape tissue from your uterus. After these procedures, you are likely to have a backache or cramps similar to menstrual cramps. Expect to pass small clots of blood from your vagina for the first few days. You may have light vaginal bleeding for several weeks after the D&C. If the doctor filled your uterus with air, your belly may feel full. You may also have shoulder pain right after the procedure. You will probably be able to go back to most of your normal activities in 1 or 2 days. This care sheet gives you a general idea about how long it will take for you to recover. But each person recovers at a different pace. Follow the steps below to get better as quickly as possible. How can you care for yourself at home? Activity    Rest when you feel tired. You will probably be able to return to work the day after the procedure. But it depends on what was done and the type of work you do. You may have some light vaginal bleeding. Use sanitary pads until you stop bleeding. Using pads makes it easier to monitor your bleeding. Do not rinse your vagina with fluid (douche). Ask your doctor when it is okay for you to have sex. Diet    You can eat your normal diet. If your stomach is upset, try bland, low-fat foods like plain rice, broiled chicken, toast, and yogurt. Drink plenty of fluids (unless your doctor tells you not to). Medicines    Your doctor will tell you if and when you can restart your medicines. You will also get instructions about taking any new medicines.      If you stopped taking aspirin or some other blood thinner, your doctor will tell you when to start taking it again. Be safe with medicines. Read and follow all instructions on the label. If the doctor gave you a prescription medicine for pain, take it as prescribed. If you are not taking a prescription pain medicine, ask your doctor if you can take an over-the-counter medicine. If your doctor prescribed antibiotics, take them as directed. Do not stop taking them just because you feel better. You need to take the full course of antibiotics. Follow-up care is a key part of your treatment and safety. Be sure to make and go to all appointments, and call your doctor if you are having problems. It's also a good idea to know your test results and keep a list of the medicines you take. When should you call for help? Call 911 anytime you think you may need emergency care. For example, call if:    You passed out (lost consciousness). You have chest pain, are short of breath, or cough up blood. Call your doctor now or seek immediate medical care if:    You have pain that does not get better after you take pain medicine. You cannot pass stools or gas. You have bright red vaginal bleeding that soaks one or more pads in an hour, or you have large clots. You have a vaginal discharge that has increased or that smells bad. You are sick to your stomach or cannot drink fluids. You have symptoms of a blood clot in your leg (called a deep vein thrombosis), such as:  Pain in your calf, back of the knee, thigh, or groin. Redness and swelling in your leg. You have signs of infection, such as: Increased pain, swelling, warmth, or redness. A fever. Watch closely for changes in your health, and be sure to contact your doctor if you have any problems. Current as of: February 23, 2022               Content Version: 13.4  © 2006-2022 [employer]. Care instructions adapted under license by [facility] ([facility]).  If you have questions about a medical condition or this instruction, always ask your healthcare professional. [place] [address] any warranty or liability for your use of this information.

## 2022-11-03 NOTE — OP NOTE
Operative Note      Patient: Salvador Labor  YOB: 1985  MRN: [id number]    Date of Procedure: 11/3/2022    Pre-Op Diagnosis: Menorrhagia with regular cycle [N92.0]  Endometrial polyp [N84.0]    Post-Op Diagnosis:  Menometrorrhagia without definite endometrial polyp       Procedure(s):  DILATATION AND CURETTAGE HYSTEROSCOPY    Surgeon(s):  Yuli Torres MD    Assistant:   * No surgical staff found *    Anesthesia: General    Estimated Blood Loss (mL): Minimal    Complications: None    Specimens:   ID Type Source Tests Collected by Time Destination   A : endometrial currettings Tissue Uterus SURGICAL PATHOLOGY Yuli Torres MD 11/3/2022 1208        Implants:  * No implants in log *      Drains: * No LDAs found *    Findings: Areas of thickened endometrium mixed with areas of hemorrhagic endometrium. No definite polyp was noted. Detailed Description of Procedure:   After being placed on the OR table in the supine position the patient underwent general endotracheal anesthesia. She was repositioned into the dorsolithotomy position and prepped and draped in the usual fashion for vaginal surgery. The cervix was exposed with a weighted vaginal speculum and grasped with single-tooth tenaculum. The uterus sounded to 7-1/2 cm and was dilated to [address]. Diagnostic scope was introduced into the endometrial cavity with findings as noted above. A thorough curettage was performed with a medium curette and the curettings were sent for histologic review. At the end of the case instruments were removed and hemostasis appeared adequate. The patient tolerated procedure well recovery was satisfactory condition.     Electronically signed by Chula Ackerman MD on 11/3/2022 at 12:20 PM

## 2022-11-03 NOTE — ANESTHESIA PRE PROCEDURE
IntraVENous Continuous Yordan Morley MD        sodium chloride flush 0.9 % injection 5-40 mL  5-40 mL IntraVENous 2 times per day Yordan Morley MD        sodium chloride flush 0.9 % injection 5-40 mL  5-40 mL IntraVENous PRN Yordan Morley MD        0.9 % sodium chloride infusion   IntraVENous PRN Yordan Morley MD        midazolam PF (VERSED) injection 2 mg  2 mg IntraVENous Once PRN Yordan Morley MD           Allergies:     Allergies   Allergen Reactions    Doxycycline Rash and Shortness Of Breath    Bactrim [Sulfamethoxazole-Trimethoprim] Hives    Sulfa Antibiotics Hives       Problem List:    Patient Active Problem List   Diagnosis Code    Incontinence in female R32    Palpitations R00.2    Menstrual bleeding problem N93.9    Hashimoto's thyroiditis E06.3    Vaginal discharge N89.8    Encounter for immunization Z23    Anxiety F41.9    History of cervical dysplasia Z87.410    Physical exam Z00.00    Episode of recurrent major depressive disorder ([facility] [address] F33.9    History of menorrhagia Z87.42    Endometrial polyp N84.0    Menorrhagia with regular cycle N92.0       Past Medical History:        Diagnosis Date    Anxiety and depression     Cervical dysplasia     Pre-Cancerous cells    Former smoker     Hashimoto's thyroiditis     Hypertension     on med for control    Kidney stones     Medullary sponge kidney     Migraines     hx    Multilevel degenerative disc disease     Staph infection     surgical incision has opened up on right breast post breast surgery 8/2022; on oral antibiotics       Past Surgical History:        Procedure Laterality Date    BREAST REDUCTION SURGERY Bilateral 11/10/2020    Josep Peña MD at [address] [place] [address] Bilateral 08/30/2022    BILATERAL REVISION OF BREAST REDUCTION performed [facility]    GYN      cervical cone biopsy    TUBAL LIGATION         Social History:    Social History     Tobacco Use    Smoking status: Former     Packs/day: 0.25     Years: 18.00     Pack years: 4.50     Types: Cigarettes     Quit date: 10/12/2022     Years since quittin.0    Smokeless tobacco: Never   Substance Use Topics    Alcohol use: No                                Counseling given: Not Answered      Vital Signs (Current): There were no vitals filed for this visit. BP Readings from Last 3 Encounters:   22 (!) 138/99   10/31/22 122/86   10/17/22 122/82       NPO Status:                                                                                 BMI:   Wt Readings from Last 3 Encounters:   22 178 lb (80.7 kg)   10/31/22 174 lb (78.9 kg)   10/17/22 170 lb (77.1 kg)     There is no height or weight on file to calculate BMI.    CBC:   Lab Results   Component Value Date/Time    HGB 13.8 11/10/2020 05:52 AM       CMP: No results found for: NA, K, CL, CO2, BUN, CREATININE, GFRAA, AGRATIO, LABGLOM, GLUCOSE, GLU, PROT, CALCIUM, BILITOT, ALKPHOS, AST, ALT    POC Tests: No results for input(s): POCGLU, POCNA, POCK, POCCL, POCBUN, POCHEMO, POCHCT in the last 72 hours.     Coags: No results found for: PROTIME, INR, APTT    HCG (If Applicable): No results found for: PREGTESTUR, PREGSERUM, HCG, HCGQUANT     ABGs: No results found for: PHART, PO2ART, SWI4CCQ, LLW0AAO, BEART, Q1VOEJAX     Type & Screen (If Applicable):  No results found for: LABABO, LABRH    Drug/Infectious Status (If Applicable):  No results found for: HIV, HEPCAB    COVID-19 Screening (If Applicable): No results found for: COVID19        Anesthesia Evaluation  Patient summary reviewed and Nursing notes reviewed  Airway: Mallampati: I     Neck ROM: full  Mouth opening: > = 3 FB   Dental: normal exam         Pulmonary:normal exam  breath sounds clear to auscultation  (+) current smoker                           Cardiovascular:  Exercise tolerance: good (>4 METS),   (+) hypertension: mild,         Rhythm: regular  Rate: normal                    Neuro/Psych:   (+) headaches: migraine headaches, depression/anxiety             GI/Hepatic/Renal: Neg GI/Hepatic/Renal ROS            Endo/Other:    (+) hypothyroidism::., .                 Abdominal:             Vascular: negative vascular ROS. Other Findings:             Anesthesia Plan      general     ASA 2       Induction: intravenous. Anesthetic plan and risks discussed with patient and spouse (and a friend).                         Mark Li MD   11/3/2022

## 2022-11-17 ENCOUNTER — OFFICE VISIT (OUTPATIENT)
Dept: GYNECOLOGY | Age: 37
End: 2022-11-17
Payer: COMMERCIAL

## 2022-11-17 VITALS
WEIGHT: 178 LBS | SYSTOLIC BLOOD PRESSURE: 110 MMHG | HEIGHT: 63 IN | DIASTOLIC BLOOD PRESSURE: 80 MMHG | BODY MASS INDEX: 31.54 KG/M2

## 2022-11-17 DIAGNOSIS — Z09 POSTOPERATIVE EXAMINATION: Primary | ICD-10-CM

## 2022-11-17 DIAGNOSIS — N84.0 ENDOMETRIAL POLYP: ICD-10-CM

## 2022-11-17 DIAGNOSIS — N92.1 MENOMETRORRHAGIA: ICD-10-CM

## 2022-11-17 PROCEDURE — 99213 OFFICE O/P EST LOW 20 MIN: CPT | Performed by: OBSTETRICS & GYNECOLOGY

## 2022-11-17 RX ORDER — BUPROPION HYDROCHLORIDE 300 MG/1
TABLET ORAL
COMMUNITY
Start: 2022-11-09

## 2022-11-17 NOTE — PROGRESS NOTES
This patient returns for postop examination after    Hysteroscopy with D&C. The path report revealed benign endometrium with a probable benign polyp. This patient's had a long history of menorrhagia and more recently developed bleeding for an entire month. The endometrial biopsy revealed late proliferative early secretory endometrium so it appears that she is ovulating. She starts missing menses and developing dysfunctional bleeding I encouraged her to return to check some lab work.

## 2025-04-14 ENCOUNTER — RX ONLY (RX ONLY)
Age: 40
End: 2025-04-14

## 2025-04-14 ENCOUNTER — APPOINTMENT (OUTPATIENT)
Dept: URBAN - METROPOLITAN AREA CLINIC 330 | Facility: CLINIC | Age: 40
Setting detail: DERMATOLOGY
End: 2025-04-14

## 2025-04-14 DIAGNOSIS — D18.0 HEMANGIOMA: ICD-10-CM | Status: STABLE

## 2025-04-14 DIAGNOSIS — L81.1 CHLOASMA: ICD-10-CM

## 2025-04-14 DIAGNOSIS — L21.8 OTHER SEBORRHEIC DERMATITIS: ICD-10-CM

## 2025-04-14 DIAGNOSIS — L81.4 OTHER MELANIN HYPERPIGMENTATION: ICD-10-CM | Status: STABLE

## 2025-04-14 DIAGNOSIS — D22 MELANOCYTIC NEVI: ICD-10-CM | Status: STABLE

## 2025-04-14 DIAGNOSIS — L82.1 OTHER SEBORRHEIC KERATOSIS: ICD-10-CM | Status: STABLE

## 2025-04-14 PROBLEM — D18.01 HEMANGIOMA OF SKIN AND SUBCUTANEOUS TISSUE: Status: ACTIVE | Noted: 2025-04-14

## 2025-04-14 PROBLEM — D22.5 MELANOCYTIC NEVI OF TRUNK: Status: ACTIVE | Noted: 2025-04-14

## 2025-04-14 PROCEDURE — ? COUNSELING

## 2025-04-14 PROCEDURE — ? MDM - TREATMENT GOALS

## 2025-04-14 PROCEDURE — ? TREATMENT REGIMEN

## 2025-04-14 PROCEDURE — ? PRESCRIPTION MEDICATION MANAGEMENT

## 2025-04-14 PROCEDURE — ? PRESCRIPTION

## 2025-04-14 PROCEDURE — 99203 OFFICE O/P NEW LOW 30 MIN: CPT

## 2025-04-14 PROCEDURE — ? PHOTO-DOCUMENTATION

## 2025-04-14 RX ORDER — TRETIONIN 0.5 MG/G
CREAM TOPICAL
Qty: 45 | Refills: 2 | COMMUNITY
Start: 2025-04-14

## 2025-04-14 RX ORDER — HYDROQUINONE 40 MG/G
CREAM TOPICAL
Qty: 28.35 | Refills: 6 | COMMUNITY
Start: 2025-04-14

## 2025-04-14 RX ORDER — HYDROQUINONE 40 MG/G
CREAM TOPICAL
Qty: 28.35 | Refills: 6

## 2025-04-14 RX ORDER — TRETIONIN 1 MG/G
CREAM TOPICAL
Qty: 45 | Refills: 5 | COMMUNITY
Start: 2025-04-14

## 2025-04-14 RX ADMIN — HYDROQUINONE: 40 CREAM TOPICAL at 00:00

## 2025-04-14 RX ADMIN — TRETIONIN: 0.5 CREAM TOPICAL at 00:00

## 2025-04-14 ASSESSMENT — LOCATION SIMPLE DESCRIPTION DERM
LOCATION SIMPLE: POSTERIOR SCALP
LOCATION SIMPLE: UPPER BACK
LOCATION SIMPLE: RIGHT CHEEK
LOCATION SIMPLE: LEFT FOREARM
LOCATION SIMPLE: LEFT CHEEK
LOCATION SIMPLE: SCALP
LOCATION SIMPLE: LEFT PRETIBIAL REGION
LOCATION SIMPLE: SUPERIOR FOREHEAD
LOCATION SIMPLE: RIGHT FOREARM

## 2025-04-14 ASSESSMENT — LOCATION DETAILED DESCRIPTION DERM
LOCATION DETAILED: RIGHT DISTAL DORSAL FOREARM
LOCATION DETAILED: POSTERIOR MID-PARIETAL SCALP
LOCATION DETAILED: RIGHT PROXIMAL DORSAL FOREARM
LOCATION DETAILED: SUPERIOR MID FOREHEAD
LOCATION DETAILED: LEFT CENTRAL POSTAURICULAR SKIN
LOCATION DETAILED: LEFT PROXIMAL DORSAL FOREARM
LOCATION DETAILED: RIGHT INFERIOR POSTAURICULAR SKIN
LOCATION DETAILED: LEFT DISTAL DORSAL FOREARM
LOCATION DETAILED: INFERIOR THORACIC SPINE
LOCATION DETAILED: LEFT INFERIOR CENTRAL MALAR CHEEK
LOCATION DETAILED: LEFT PROXIMAL PRETIBIAL REGION
LOCATION DETAILED: RIGHT MEDIAL MALAR CHEEK
LOCATION DETAILED: RIGHT INFERIOR LATERAL MALAR CHEEK

## 2025-04-14 ASSESSMENT — LOCATION ZONE DERM
LOCATION ZONE: FACE
LOCATION ZONE: TRUNK
LOCATION ZONE: ARM
LOCATION ZONE: SCALP
LOCATION ZONE: LEG

## 2025-04-14 NOTE — HPI: EVALUATION OF SKIN LESION(S)
What Type Of Note Output Would You Prefer (Optional)?: Bullet Format
Hpi Title: Evaluation of Skin Lesions
Additional History: Pt is concerned about a black spot on her chest that has been there about a year. Pt states she thought it was a blackhead but she has tried to extract it and cannot get it out.

## 2025-04-14 NOTE — PROCEDURE: PRESCRIPTION MEDICATION MANAGEMENT
Render In Strict Bullet Format?: No
Detail Level: Zone
Initiate Treatment: tretinoin 0.05 % topical cream \\nApply a pea sized amount at bedtime, starting 2-3 nights a week then gradually increase to nightly as tolerated\\n\\nhydroquinone 4 % topical cream \\nApply to face with sunblock in the morning and then at night for 2-3 months

## (undated) DEVICE — TUBING ASPIR L8IN OD3/8IN CLR VYN DISP

## (undated) DEVICE — GARMENT,MEDLINE,DVT,INT,CALF,MED, GEN2: Brand: MEDLINE

## (undated) DEVICE — INTENDED FOR TISSUE SEPARATION, AND OTHER PROCEDURES THAT REQUIRE A SHARP SURGICAL BLADE TO PUNCTURE OR CUT.: Brand: BARD-PARKER SAFETY BLADES SIZE 15, STERILE

## (undated) DEVICE — SUTURE VCRL SZ 3-0 L18IN ABSRB UD PS-2 L19MM 1/2 CIR J497G

## (undated) DEVICE — TUBING SUCT L9FT FOR AUTOFUSE INFLTR SYS

## (undated) DEVICE — SUTURE STRATAFIX SPRL MCRYL + SZ 3-0 L18IN ABSRB UD PS-2 SXMP1B107

## (undated) DEVICE — CARDINAL HEALTH FLEXIBLE LIGHT HANDLE COVER: Brand: CARDINAL HEALTH

## (undated) DEVICE — DRAPE,CHEST,FENES,15X10,STERIL: Brand: MEDLINE

## (undated) DEVICE — REM POLYHESIVE ADULT PATIENT RETURN ELECTRODE: Brand: VALLEYLAB

## (undated) DEVICE — Device

## (undated) DEVICE — GLOVE SURG SZ 7 CRM LTX FREE POLYISOPRENE POLYMER BEAD ANTI

## (undated) DEVICE — STAPLER SKIN SQ 30 ABSRB STPL DISP INSORB

## (undated) DEVICE — BINDER ABD M/L H12IN FOR 46-62IN WHT 4 SLD PNL DSGN HOOP

## (undated) DEVICE — 2000CC GUARDIAN II: Brand: GUARDIAN

## (undated) DEVICE — UNIVERSAL DRAPES: Brand: MEDLINE INDUSTRIES, INC.

## (undated) DEVICE — SUTURE STRATAFIX SZ 3-0 L14CM NONABSORBABLE UD L19MM FS-2 SXMP2B406

## (undated) DEVICE — SOLUTION IRRIG 3000ML 0.9% SOD CHL USP UROMATIC PLAS CONT

## (undated) DEVICE — SUTURE ETHLN SZ 4-0 L18IN NONABSORBABLE BLK L19MM PS-2 3/8 1667H

## (undated) DEVICE — SOLUTION IV 1000ML 0.9% SOD CHL

## (undated) DEVICE — SUTURE NONABSORBABLE BRAIDED 2-0 CT-2 1X30 IN ETHBND EXCEL X411H

## (undated) DEVICE — SUTURE ABSRB X-1 REV CUT 1/2 CIR 22MM UD BRAID 27IN SZ 3-0 J458H

## (undated) DEVICE — NEPTUNE E-SEP SMOKE EVACUATION PENCIL, COATED, 70MM BLADE, PUSH BUTTON SWITCH: Brand: NEPTUNE E-SEP

## (undated) DEVICE — 3M™ TEGADERM™ TRANSPARENT FILM DRESSING FRAME STYLE, 1626W, 4 IN X 4-3/4 IN (10 CM X 12 CM), 50/CT 4CT/CASE: Brand: 3M™ TEGADERM™

## (undated) DEVICE — BANDAGE COMPR W6INXL10YD ST M E WHITE/BEIGE

## (undated) DEVICE — STRIP,CLOSURE,WOUND,MEDI-STRIP,1/2X4: Brand: MEDLINE

## (undated) DEVICE — BANDAGE,GAUZE,BULKEE II,4.5"X4.1YD,STRL: Brand: MEDLINE

## (undated) DEVICE — CONTAINER SPEC HISTOLOGY 900ML POLYPR

## (undated) DEVICE — SURGICAL PROCEDURE PACK BASIC ST FRANCIS

## (undated) DEVICE — DRESSING,GAUZE,XEROFORM,CURAD,5"X9",ST: Brand: CURAD

## (undated) DEVICE — MINOR LITHOTOMY PACK: Brand: MEDLINE INDUSTRIES, INC.

## (undated) DEVICE — SUTURE VCRL SZ 3-0 L27IN ABSRB UD L26MM SH 1/2 CIR J416H

## (undated) DEVICE — AMD ANTIMICROBIAL BANDAGE ROLL,6 PLY: Brand: KERLIX

## (undated) DEVICE — SOLUTION IRRIG 1000ML 09% SOD CHL USP PIC PLAS CONTAINER

## (undated) DEVICE — SKIN MARKER,REGULAR TIP WITH RULER AND LABELS: Brand: DEVON

## (undated) DEVICE — MICRODISSECTION NEEDLE STRAIGHT SLEEVE: Brand: COLORADO

## (undated) DEVICE — TETRA-FLEX CF WOVEN LATEX FREE ELASTIC BANDAGE 6" X 11 YD: Brand: TETRA-FLEX™CF

## (undated) DEVICE — DRAPE TWL SURG 16X26IN BLU ORB04] ALLCARE INC]

## (undated) DEVICE — ELECTRODE NDL 2.8IN COAT VALLEYLAB

## (undated) DEVICE — PREP SKN CHLRAPRP APL 26ML STR --

## (undated) DEVICE — SPONGE LAP 18X18IN STRL -- 5/PK

## (undated) DEVICE — GLOVE SURG SZ 85 CRM LTX FREE POLYISOPRENE POLYMER BEAD ANTI

## (undated) DEVICE — BUTTON SWITCH PENCIL BLADE ELECTRODE, HOLSTER: Brand: EDGE

## (undated) DEVICE — 4-PORT MANIFOLD: Brand: NEPTUNE 2

## (undated) DEVICE — Device: Brand: MEDCO MANUFACTURING INC

## (undated) DEVICE — SYRINGE CATH TIP 50ML

## (undated) DEVICE — NEEDLE SPNL 22GA L3.5IN BLK HUB S STL REG WALL FIT STYL W/

## (undated) DEVICE — DRAPE,UNDERBUTTOCKS,PCH,STERILE: Brand: MEDLINE

## (undated) DEVICE — APPLICATOR MEDICATED 26 CC SOLUTION HI LT ORNG CHLORAPREP